# Patient Record
Sex: FEMALE | Race: WHITE | Employment: FULL TIME | URBAN - METROPOLITAN AREA
[De-identification: names, ages, dates, MRNs, and addresses within clinical notes are randomized per-mention and may not be internally consistent; named-entity substitution may affect disease eponyms.]

---

## 2017-02-16 DIAGNOSIS — E03.8 SUBCLINICAL HYPOTHYROIDISM: ICD-10-CM

## 2017-02-21 RX ORDER — LEVOTHYROXINE SODIUM 50 UG/1
TABLET ORAL
Qty: 90 TABLET | Refills: 0 | Status: SHIPPED | OUTPATIENT
Start: 2017-02-21 | End: 2017-05-29

## 2017-03-15 ENCOUNTER — TELEPHONE (OUTPATIENT)
Dept: FAMILY MEDICINE | Facility: CLINIC | Age: 37
End: 2017-03-15

## 2017-03-15 ENCOUNTER — OFFICE VISIT (OUTPATIENT)
Dept: FAMILY MEDICINE | Facility: CLINIC | Age: 37
End: 2017-03-15
Payer: COMMERCIAL

## 2017-03-15 VITALS
SYSTOLIC BLOOD PRESSURE: 118 MMHG | OXYGEN SATURATION: 99 % | DIASTOLIC BLOOD PRESSURE: 64 MMHG | WEIGHT: 162.5 LBS | TEMPERATURE: 97.7 F | HEART RATE: 80 BPM | BODY MASS INDEX: 28.79 KG/M2 | HEIGHT: 63 IN

## 2017-03-15 DIAGNOSIS — I47.10 PAROXYSMAL SUPRAVENTRICULAR TACHYCARDIA (H): ICD-10-CM

## 2017-03-15 DIAGNOSIS — E03.8 SUBCLINICAL HYPOTHYROIDISM: Primary | ICD-10-CM

## 2017-03-15 DIAGNOSIS — R42 LIGHTHEADEDNESS: ICD-10-CM

## 2017-03-15 LAB — TSH SERPL DL<=0.005 MIU/L-ACNC: 1.45 MU/L (ref 0.4–4)

## 2017-03-15 PROCEDURE — 99213 OFFICE O/P EST LOW 20 MIN: CPT | Performed by: FAMILY MEDICINE

## 2017-03-15 PROCEDURE — 36415 COLL VENOUS BLD VENIPUNCTURE: CPT | Performed by: FAMILY MEDICINE

## 2017-03-15 PROCEDURE — 84443 ASSAY THYROID STIM HORMONE: CPT | Performed by: FAMILY MEDICINE

## 2017-03-15 RX ORDER — ATENOLOL 25 MG/1
25 TABLET ORAL DAILY PRN
Qty: 30 TABLET | Refills: 5 | Status: SHIPPED | OUTPATIENT
Start: 2017-03-15 | End: 2018-04-25

## 2017-03-15 ASSESSMENT — PAIN SCALES - GENERAL: PAINLEVEL: NO PAIN (0)

## 2017-03-15 NOTE — TELEPHONE ENCOUNTER
----- Message from Gemini Arredondo MD sent at 3/15/2017  1:04 PM CDT -----  Please notify her that her thyroid is now perfect. MUCH improved from before. I recommend she stay on this dose and I'll refill it for 1 year.  We need to continue to check her thyroid once yearly, and to follow up in between if needed.  I should see her again either in the fall for her next physical or in 1 year from now.   Gemini Arredondo MD

## 2017-03-15 NOTE — TELEPHONE ENCOUNTER
Nathalie returns call and message below is relayed.  Pt states understanding and had no other questions or concerns

## 2017-03-15 NOTE — PROGRESS NOTES
SUBJECTIVE:                                                    Nathalie Younger is a 36 year old female who presents to clinic today for the following health issues:    Hypothyroidism Follow-up    Patient was found to have an abnormal TSH at 4.52 mU/L on 10/31/2016 while at her routine physical. I started her on Levothyroxine 50 mg qd at that time. She has been taking her Levothyroxine daily.       Since last visit, patient describes the following symptoms: Weight stable, no hair loss, no skin changes, no constipation, no loose stools. She feels better.          Amount of exercise or physical activity: 2-3 days/week for an average of 30-45 minutes    Problems taking medications regularly: No    Medication side effects: None  Diet: regular (no restrictions)    Problem list and histories reviewed & adjusted, as indicated.  Additional history: as documented    Patient Active Problem List   Diagnosis     Tobacco use disorder     Headache     CARDIOVASCULAR SCREENING; LDL GOAL LESS THAN 160     Subclinical hypothyroidism     Past Surgical History   Procedure Laterality Date     No history of surgery         Social History   Substance Use Topics     Smoking status: Current Every Day Smoker     Packs/day: 0.50     Years: 3.00     Smokeless tobacco: Never Used     Alcohol use Yes      Comment: not much      Family History   Problem Relation Age of Onset     Hypertension Father      CANCER Maternal Grandmother      lung     Hypertension Sister      CANCER Maternal Grandfather      lung and bone     CANCER Paternal Grandfather      stomach     Prostate Cancer Father      CANCER Father      kidney     Breast Cancer Mother 53     CANCER Paternal Aunt 56     cervical         Current Outpatient Prescriptions   Medication Sig Dispense Refill     atenolol (TENORMIN) 25 MG tablet Take 1 tablet (25 mg) by mouth daily as needed 30 tablet 5     levothyroxine (SYNTHROID/LEVOTHROID) 50 MCG tablet TAKE ONE TABLET BY MOUTH ONCE DAILY 90  "tablet 0     norethindrone-ethinyl estradiol (MICROGESTIN) 1-20 MG-MCG per tablet Take 1 tablet by mouth daily 84 tablet 3     [DISCONTINUED] atenolol (TENORMIN) 25 MG tablet Take 1 tablet (25 mg) by mouth daily 30 tablet 1     fluticasone (FLONASE) 50 MCG/ACT nasal spray Spray 1-2 sprays into both nostrils daily (Patient not taking: Reported on 3/15/2017) 16 g 1     No Known Allergies    Reviewed and updated as needed this visit by clinical staff  Tobacco  Allergies  Meds  Med Hx  Surg Hx  Fam Hx  Soc Hx      Reviewed and updated as needed this visit by Provider       ROS:  Neuro: at her last visit, she had concerns for near syncopal episodes, including a hot flash and lightheadedness. I started her on Atenolol 25 mg at that time. She says that she has only been taking this prn, and it seems to stop these episodes. She says that these episodes have decreased significantly since starting on her Levothyroxine, so she thinks they may have been related to her thyroid. Only having 1-2 times per month now.  She has decreased her caffeine intake, but is still drinking some Red Bull energy drinks.   All other ROS reviewed and are negative or non-contributory except as stated in HPI or above.     OBJECTIVE:                                                    /64 (BP Location: Left arm, Patient Position: Chair, Cuff Size: Adult Regular)  Pulse 80  Temp 97.7  F (36.5  C) (Temporal)  Ht 5' 3\" (1.6 m)  Wt 162 lb 8 oz (73.7 kg)  SpO2 99%  BMI 28.79 kg/m2  Body mass index is 28.79 kg/(m^2).   Vitals noted.  Patient alert, oriented, and in no acute distress.  Neck:  Supple without thyromegaly, lymphadenopathy, JVD or masses.  CV:  RRR without murmur. Pulse down to 80 on exam.   Respiratory:  Lungs clear to auscultation bilaterally.    Diagnostic Test Results:  Orders Placed This Encounter   Procedures     TSH with free T4 reflex        ASSESSMENT:                                                        ICD-10-CM    1. " Subclinical hypothyroidism E03.9 TSH with free T4 reflex   2. Lightheadedness R42 atenolol (TENORMIN) 25 MG tablet   3. Paroxysmal supraventricular tachycardia (H) I47.1 atenolol (TENORMIN) 25 MG tablet       PLAN:                                                      Patient's pulse was fast at 105 upon presentation to the clinic. Vitals were otherwise normal. On re-exam, her pulse was decreased to 80. Labs drawn, as above. Will notify with results and discuss further evaluation/ treatment if needed at that time. Refilled patient's Atenolol, see orders. Will refill her Levothyroxine after I review her labs. If I do not adjust her medications, will follow up in 6 months. If I do adjust her Levothyroxine, follow up in 3 months. Patient is in agreement with this treatment plan.     This document serves as a record of services personally performed by Gemini Arredondo MD. It was created on their behalf by Ladonna Canales, a trained medical scribe. The creation of this record is based on the provider's personal observations and the statements of the patient. This document has been checked and approved by the attending provider.    Gemini Arredondo MD  [facility]

## 2017-03-15 NOTE — PROGRESS NOTES
Please notify her that her thyroid is now perfect. MUCH improved from before. I recommend she stay on this dose and I'll refill it for 1 year.  We need to continue to check her thyroid once yearly, and to follow up in between if needed.  I should see her again either in the fall for her next physical or in 1 year from now.   Gemini Arredondo MD

## 2017-05-29 DIAGNOSIS — E03.8 SUBCLINICAL HYPOTHYROIDISM: ICD-10-CM

## 2017-05-30 NOTE — TELEPHONE ENCOUNTER
levothyroxine (SYNTHROID/LEVOTHROID) 50 MCG tablet     Last Written Prescription Date: 2/21/17  Last Quantity: 90, # refills: 0  Last Office Visit with [facility], [facility] or [facility] prescribing provider: 3/15/17        TSH   Date Value Ref Range Status   03/15/2017 1.45 0.40 - 4.00 mU/L Final

## 2017-05-31 RX ORDER — LEVOTHYROXINE SODIUM 50 UG/1
50 TABLET ORAL DAILY
Qty: 90 TABLET | Refills: 3 | Status: SHIPPED | OUTPATIENT
Start: 2017-05-31 | End: 2018-04-25

## 2017-05-31 NOTE — TELEPHONE ENCOUNTER
Prescription approved per [facility] Refill Protocol.    Ana Olea RN

## 2017-09-30 DIAGNOSIS — Z30.41 ENCOUNTER FOR SURVEILLANCE OF CONTRACEPTIVE PILLS: ICD-10-CM

## 2017-10-02 RX ORDER — ESTAZOLAM 2 MG/1
TABLET ORAL
Qty: 84 TABLET | Refills: 1 | Status: SHIPPED | OUTPATIENT
Start: 2017-10-02 | End: 2018-07-20

## 2017-10-02 NOTE — TELEPHONE ENCOUNTER
norethindrone-ethinyl estradiol (MICROGESTIN) 1-20 MG-MCG per tablet      Last Written Prescription Date: 10/31/16  Last Fill Quantity: 84,  # refills: 3   Last Office Visit with [facility], [facility] or [facility] prescribing provider: 3/15/17

## 2017-10-02 NOTE — TELEPHONE ENCOUNTER
Prescription approved per [facility] Refill Protocol.    Ana Olea RN

## 2018-04-25 DIAGNOSIS — I47.10 PAROXYSMAL SUPRAVENTRICULAR TACHYCARDIA (H): ICD-10-CM

## 2018-04-25 DIAGNOSIS — R42 LIGHTHEADEDNESS: ICD-10-CM

## 2018-04-25 DIAGNOSIS — E03.8 SUBCLINICAL HYPOTHYROIDISM: ICD-10-CM

## 2018-04-25 RX ORDER — ATENOLOL 25 MG/1
25 TABLET ORAL DAILY
Qty: 30 TABLET | Refills: 0 | Status: SHIPPED | OUTPATIENT
Start: 2018-04-25 | End: 2018-04-27

## 2018-04-25 RX ORDER — LEVOTHYROXINE SODIUM 50 UG/1
50 TABLET ORAL DAILY
Qty: 30 TABLET | Refills: 0 | Status: SHIPPED | OUTPATIENT
Start: 2018-04-25 | End: 2018-07-02

## 2018-04-25 NOTE — TELEPHONE ENCOUNTER
"Requested Prescriptions   Pending Prescriptions Disp Refills     atenolol (TENORMIN) 25 MG tablet [Pharmacy Med Name: ATENOLOL 25MG       TAB] 30 tablet 5     Sig: TAKE ONE TABLET BY MOUTH ONCE DAILY AS NEEDED    Beta-Blockers Protocol Failed    4/25/2018  2:22 PM       Failed - Blood pressure under 140/90 in past 12 months    BP Readings from Last 3 Encounters:   03/15/17 118/64   10/31/16 102/78   06/15/16 116/88                Failed - Recent (12 mo) or future (30 days) visit within the authorizing provider's specialty    Patient had office visit in the last 12 months or has a visit in the next 30 days with authorizing provider or within the authorizing provider's specialty.  See \"Patient Info\" tab in inbasket, or \"Choose Columns\" in Meds & Orders section of the refill encounter.           Passed - Patient is age 6 or older        levothyroxine (SYNTHROID/LEVOTHROID) 50 MCG tablet [Pharmacy Med Name: LEVOTHYROXIN 50MCG  TAB] 90 tablet 3     Sig: TAKE ONE TABLET BY MOUTH ONCE DAILY    Thyroid Protocol Failed    4/25/2018  2:22 PM       Failed - Recent (12 mo) or future (30 days) visit within the authorizing provider's specialty    Patient had office visit in the last 12 months or has a visit in the next 30 days with authorizing provider or within the authorizing provider's specialty.  See \"Patient Info\" tab in inbasket, or \"Choose Columns\" in Meds & Orders section of the refill encounter.           Failed - Normal TSH on file in past 12 months    Recent Labs   Lab Test  03/15/17   1120   TSH  1.45             Passed - Patient is 12 years or older       Passed - No active pregnancy on record    If patient is pregnant or has had a positive pregnancy test, please check TSH.         Passed - No positive pregnancy test in past 12 months    If patient is pregnant or has had a positive pregnancy test, please check TSH.        Atenolol  Last Written Prescription Date:  3/15/17  Last Fill Quantity: 30,  # refills: 5   Last " office visit: 3/15/2017 with prescribing provider:  3/15/17   Future Office Visit:      Levothyroxine  Last Written Prescription Date:  5/31/17  Last Fill Quantity: 90,  # refills: 3   Last office visit: 3/15/2017 with prescribing provider:  3/15/17   Future Office Visit:

## 2018-04-25 NOTE — TELEPHONE ENCOUNTER
Rx refilled per RN protocol.  1 month    Will forward to schedulers to schedule patient for OV.  Sonia Flores RN

## 2018-04-26 NOTE — TELEPHONE ENCOUNTER
Pt called back and relayed message below. She states she has no insurance until July 1st. She will not come in til then.

## 2018-04-27 RX ORDER — ATENOLOL 25 MG/1
25 TABLET ORAL DAILY
Qty: 93 TABLET | Refills: 0 | Status: SHIPPED | OUTPATIENT
Start: 2018-04-27 | End: 2018-07-20

## 2018-04-30 NOTE — TELEPHONE ENCOUNTER
Left message for patient to call back and speak with any .    Thank you,  Ivett Jean-Baptiste   for [facility]

## 2018-07-02 DIAGNOSIS — E03.8 SUBCLINICAL HYPOTHYROIDISM: ICD-10-CM

## 2018-07-02 NOTE — TELEPHONE ENCOUNTER
"Requested Prescriptions   Pending Prescriptions Disp Refills     levothyroxine (SYNTHROID/LEVOTHROID) 50 MCG tablet [Pharmacy Med Name: LEVOTHYROXIN 50MCG  TAB] 30 tablet 0     Sig: TAKE 1 TABLET BY MOUTH ONCE DAILY APPOINTMENT  NEEDED  FOR  ADDITIONAL  REFILLS    Thyroid Protocol Failed    7/2/2018 10:48 AM       Failed - Normal TSH on file in past 12 months    Recent Labs   Lab Test  03/15/17   1120   TSH  1.45             Passed - Patient is 12 years or older       Passed - Recent (12 mo) or future (30 days) visit within the authorizing provider's specialty    Patient had office visit in the last 12 months or has a visit in the next 30 days with authorizing provider or within the authorizing provider's specialty.  See \"Patient Info\" tab in inbasket, or \"Choose Columns\" in Meds & Orders section of the refill encounter.           Passed - No active pregnancy on record    If patient is pregnant or has had a positive pregnancy test, please check TSH.         Passed - No positive pregnancy test in past 12 months    If patient is pregnant or has had a positive pregnancy test, please check TSH.          Last Written Prescription Date:  4/25/18  Last Fill Quantity: 30,  # refills: 0   Last office visit: 3/15/2017 with prescribing provider:     Future Office Visit:   Next 5 appointments (look out 90 days)     Jul 20, 2018  8:45 AM CDT   PHYSICAL with Gemini Arredondo MD   [facility]    [address] [address] [phone]                   "

## 2018-07-05 NOTE — TELEPHONE ENCOUNTER
Routing refill request to provider for review/approval because:  Labs not current:  TSH  T'd up 1 month for provider review.  To get to physical on 7/20/18.  Sonia Flores, THOMPSONN, RN

## 2018-07-06 RX ORDER — LEVOTHYROXINE SODIUM 50 UG/1
TABLET ORAL
Qty: 90 TABLET | Refills: 0 | Status: SHIPPED | OUTPATIENT
Start: 2018-07-06 | End: 2018-07-24

## 2018-07-06 NOTE — TELEPHONE ENCOUNTER
Patient calls to check status of refill request.   SHe is upset, as was told it would be filled to last until appointment.   Note from 4/25/18:  OK have her schedule now for July, and I'll approve refills until then.   Gemini Arredondo MD     She is out, if this can be called as soon as possible, appointment is 7/20/18.

## 2018-07-20 ENCOUNTER — OFFICE VISIT (OUTPATIENT)
Dept: FAMILY MEDICINE | Facility: CLINIC | Age: 38
End: 2018-07-20
Payer: COMMERCIAL

## 2018-07-20 VITALS
HEIGHT: 63 IN | TEMPERATURE: 98.4 F | WEIGHT: 162.6 LBS | DIASTOLIC BLOOD PRESSURE: 72 MMHG | SYSTOLIC BLOOD PRESSURE: 108 MMHG | BODY MASS INDEX: 28.81 KG/M2 | HEART RATE: 87 BPM | OXYGEN SATURATION: 99 %

## 2018-07-20 DIAGNOSIS — Z30.41 ENCOUNTER FOR SURVEILLANCE OF CONTRACEPTIVE PILLS: ICD-10-CM

## 2018-07-20 DIAGNOSIS — B07.8 COMMON WART: ICD-10-CM

## 2018-07-20 DIAGNOSIS — F17.200 TOBACCO USE DISORDER: ICD-10-CM

## 2018-07-20 DIAGNOSIS — Z00.01 ENCOUNTER FOR ROUTINE ADULT HEALTH EXAMINATION WITH ABNORMAL FINDINGS: Primary | ICD-10-CM

## 2018-07-20 DIAGNOSIS — R42 LIGHTHEADEDNESS: ICD-10-CM

## 2018-07-20 DIAGNOSIS — R09.81 NASAL SINUS CONGESTION: ICD-10-CM

## 2018-07-20 DIAGNOSIS — D22.9 ATYPICAL MOLE: ICD-10-CM

## 2018-07-20 DIAGNOSIS — Z13.6 CARDIOVASCULAR SCREENING; LDL GOAL LESS THAN 160: ICD-10-CM

## 2018-07-20 DIAGNOSIS — E03.8 SUBCLINICAL HYPOTHYROIDISM: ICD-10-CM

## 2018-07-20 DIAGNOSIS — R21 RASH: ICD-10-CM

## 2018-07-20 DIAGNOSIS — I47.10 PAROXYSMAL SUPRAVENTRICULAR TACHYCARDIA (H): ICD-10-CM

## 2018-07-20 LAB
ANION GAP SERPL CALCULATED.3IONS-SCNC: 9 MMOL/L (ref 3–14)
BUN SERPL-MCNC: 11 MG/DL (ref 7–30)
CALCIUM SERPL-MCNC: 9.1 MG/DL (ref 8.5–10.1)
CHLORIDE SERPL-SCNC: 106 MMOL/L (ref 94–109)
CHOLEST SERPL-MCNC: 178 MG/DL
CO2 SERPL-SCNC: 25 MMOL/L (ref 20–32)
CREAT SERPL-MCNC: 0.84 MG/DL (ref 0.52–1.04)
GFR SERPL CREATININE-BSD FRML MDRD: 77 ML/MIN/1.7M2
GLUCOSE SERPL-MCNC: 88 MG/DL (ref 70–99)
HDLC SERPL-MCNC: 56 MG/DL
LDLC SERPL CALC-MCNC: 99 MG/DL
NONHDLC SERPL-MCNC: 122 MG/DL
POTASSIUM SERPL-SCNC: 4.2 MMOL/L (ref 3.4–5.3)
SODIUM SERPL-SCNC: 140 MMOL/L (ref 133–144)
TRIGL SERPL-MCNC: 117 MG/DL
TSH SERPL DL<=0.005 MIU/L-ACNC: 1.56 MU/L (ref 0.4–4)

## 2018-07-20 PROCEDURE — 84443 ASSAY THYROID STIM HORMONE: CPT | Performed by: FAMILY MEDICINE

## 2018-07-20 PROCEDURE — 80048 BASIC METABOLIC PNL TOTAL CA: CPT | Performed by: FAMILY MEDICINE

## 2018-07-20 PROCEDURE — 80061 LIPID PANEL: CPT | Performed by: FAMILY MEDICINE

## 2018-07-20 PROCEDURE — 36415 COLL VENOUS BLD VENIPUNCTURE: CPT | Performed by: FAMILY MEDICINE

## 2018-07-20 PROCEDURE — 99395 PREV VISIT EST AGE 18-39: CPT | Mod: 25 | Performed by: FAMILY MEDICINE

## 2018-07-20 PROCEDURE — 17110 DESTRUCTION B9 LES UP TO 14: CPT | Performed by: FAMILY MEDICINE

## 2018-07-20 RX ORDER — NORETHINDRONE ACETATE AND ETHINYL ESTRADIOL .02; 1 MG/1; MG/1
1 TABLET ORAL DAILY
Qty: 84 TABLET | Refills: 3 | Status: SHIPPED | OUTPATIENT
Start: 2018-07-20 | End: 2019-08-09

## 2018-07-20 RX ORDER — FLUTICASONE PROPIONATE 50 MCG
1-2 SPRAY, SUSPENSION (ML) NASAL DAILY
Qty: 16 G | Refills: 1 | Status: SHIPPED | OUTPATIENT
Start: 2018-07-20 | End: 2019-09-13

## 2018-07-20 RX ORDER — ATENOLOL 25 MG/1
25 TABLET ORAL DAILY
Qty: 93 TABLET | Refills: 1 | Status: SHIPPED | OUTPATIENT
Start: 2018-07-20 | End: 2019-09-13

## 2018-07-20 ASSESSMENT — PAIN SCALES - GENERAL: PAINLEVEL: NO PAIN (0)

## 2018-07-20 NOTE — MR AVS SNAPSHOT
After Visit Summary   7/20/2018    Nathalie Younger    MRN: [id number]           Patient Information     Date Of Birth          1980        Visit Information        Provider Department      7/20/2018 8:45 AM Gemini Arredondo MD [facility]        Today's Diagnoses     CARDIOVASCULAR SCREENING; LDL GOAL LESS THAN 160    -  1    Lightheadedness        Paroxysmal supraventricular tachycardia (H)        Subclinical hypothyroidism        Encounter for surveillance of contraceptive pills        Nasal sinus congestion          Care Instructions      Preventive Health Recommendations  Female Ages 26 - 39  Yearly exam:   See your health care provider every year in order to    Review health changes.     Discuss preventive care.      Review your medicines if you your doctor has prescribed any.    Until age 30: Get a Pap test every three years (more often if you have had an abnormal result).    After age 30: Talk to your doctor about whether you should have a Pap test every 3 years or have a Pap test with HPV screening every 5 years.   You do not need a Pap test if your uterus was removed (hysterectomy) and you have not had cancer.  You should be tested each year for STDs (sexually transmitted diseases), if you're at risk.   Talk to your provider about how often to have your cholesterol checked.  If you are at risk for diabetes, you should have a diabetes test (fasting glucose).  Shots: Get a flu shot each year. Get a tetanus shot every 10 years.   Nutrition:     Eat at least 5 servings of fruits and vegetables each day.    Eat whole-grain bread, whole-wheat pasta and brown rice instead of white grains and rice.    Get adequate Calcium and Vitamin D.     Lifestyle    Exercise at least 150 minutes a week (30 minutes a day, 5 days of the week). This will help you control your weight and prevent disease.    Limit alcohol to one drink per day.    No smoking.     Wear sunscreen to  "prevent skin cancer.    See your dentist every six months for an exam and cleaning.            Follow-ups after your visit        Who to contact     If you have questions or need follow up information about today's clinic visit or your schedule please contact [facility] directly at [phone].  Normal or non-critical lab and imaging results will be communicated to you by MyChart, letter or phone within 4 business days after the clinic has received the results. If you do not hear from us within 7 days, please contact the clinic through MyChart or phone. If you have a critical or abnormal lab result, we will notify you by phone as soon as possible.  Submit refill requests through [employer] or call your pharmacy and they will forward the refill request to us. Please allow 3 business days for your refill to be completed.          Additional Information About Your Visit        Care EveryWhere ID     This is your Care EveryWhere ID. This could be used by other organizations to access your [facility] medical records  [id number]        Your Vitals Were     Pulse Temperature Height Last Period Pulse Oximetry Breastfeeding?    87 98.4  F (36.9  C) (Temporal) 5' 3\" (1.6 m) 07/11/2018 (Exact Date) 99% No    BMI (Body Mass Index)                   28.8 kg/m2            Blood Pressure from Last 3 Encounters:   07/20/18 108/72   03/15/17 118/64   10/31/16 102/78    Weight from Last 3 Encounters:   07/20/18 162 lb 9.6 oz (73.8 kg)   03/15/17 162 lb 8 oz (73.7 kg)   10/31/16 164 lb 12.8 oz (74.8 kg)              We Performed the Following     Basic metabolic panel     Lipid panel reflex to direct LDL Fasting     TSH with free T4 reflex          Today's Medication Changes          These changes are accurate as of 7/20/18 10:05 AM.  If you have any questions, ask your nurse or doctor.               These medicines have changed or have updated prescriptions.        Dose/Directions    atenolol 25 MG tablet   Commonly " known as:  TENORMIN   This may have changed:  additional instructions   Used for:  Lightheadedness, Paroxysmal supraventricular tachycardia (H)   Changed by:  Gemini Arredondo MD        Dose:  25 mg   Take 1 tablet (25 mg) by mouth daily   Quantity:  93 tablet   Refills:  1       norethindrone-ethinyl estradiol 1-20 MG-MCG per tablet   Commonly known as:  MICROGESTIN   This may have changed:  See the new instructions.   Used for:  Encounter for surveillance of contraceptive pills   Changed by:  Gemini Arredondo MD        Dose:  1 tablet   Take 1 tablet by mouth daily   Quantity:  84 tablet   Refills:  3            Where to get your medicines      These medications [facility] [address] [place] [address] [address]  [address] [facility] [address]     Phone:  [phone]     atenolol 25 MG tablet    fluticasone 50 MCG/ACT spray    norethindrone-ethinyl estradiol 1-20 MG-MCG per tablet                Primary Care Provider Office Phone # Fax #    Gemini Arredondo [phone] [address] [facility] [address] [facility] [address]   [facility] [address]        Equal Access to Services     [facility] HospitalFATOUMATA AH: Hadii aad ku hadasho Soomaali, waaxda luqadaha, qaybta kaalmada adeegyada, waxay idiin hayaan adeeg aaliyah kirkpatrick ah. So [facility] [phone].    ATENCIÓN: Si habla español, tiene a latham disposición servicios gratuitos de asistencia lingüística. [facility] [phone].    We comply with applicable federal civil rights laws and [place] laws. We do not discriminate on the basis of race, color, national origin, age, disability, sex, sexual orientation, or gender identity.            Thank you!     Thank you for choosing [facility]  for your care. Our goal is always to provide you with excellent care. Hearing back from our patients is one way we can continue to improve our services. Please take a few minutes to complete the written survey that you may receive in the mail  after your visit with us. Thank you!             Your Updated Medication List - Protect others around you: Learn how to safely use, store and throw away your medicines at www.disposemymeds.org.          This list is accurate as of 7/20/18 10:05 AM.  Always use your most recent med list.                   Brand Name Dispense Instructions for use Diagnosis    atenolol 25 MG tablet    TENORMIN    93 tablet    Take 1 tablet (25 mg) by mouth daily    Lightheadedness, Paroxysmal supraventricular tachycardia (H)       fluticasone 50 MCG/ACT spray    FLONASE    16 g    Spray 1-2 sprays into both nostrils daily    Nasal sinus congestion       levothyroxine 50 MCG tablet    SYNTHROID/LEVOTHROID    90 tablet    TAKE 1 TABLET BY MOUTH ONCE DAILY APPOINTMENT  NEEDED  FOR  ADDITIONAL  REFILLS    Subclinical hypothyroidism       norethindrone-ethinyl estradiol 1-20 MG-MCG per tablet    MICROGESTIN    84 tablet    Take 1 tablet by mouth daily    Encounter for surveillance of contraceptive pills

## 2018-07-20 NOTE — PROGRESS NOTES
SUBJECTIVE:   CC: Nathalie Younger is an 37 year old woman who presents for preventive health visit.  She also has a few other concerns, see ROS below.        Physical   Annual:     Getting at least 3 servings of Calcium per day:  Yes    Bi-annual eye exam:  NO    Dental care twice a year:  NO    Sleep apnea or symptoms of sleep apnea:  None    Diet:  Regular (no restrictions)    Frequency of exercise:  None    Taking medications regularly:  Yes    Medication side effects:  None    Additional concerns today:  YES (wart treatment, red ladarius on nose)    Today's PHQ-2 Score:   PHQ-2 ( 1999 Pf[employer]) 7/20/2018   Q1: Little interest or pleasure in doing things 0   Q2: Feeling down, depressed or hopeless 0   PHQ-2 Score 0   Q1: Little interest or pleasure in doing things Not at all   Q2: Feeling down, depressed or hopeless Not at all   PHQ-2 Score 0     Answers for HPI/ROS submitted by the patient on 7/20/2018   PHQ-2 Score: 0    Abuse: Current or Past(Physical, Sexual or Emotional)- No  Do you feel safe in your environment - Yes    Social History   Substance Use Topics     Smoking status: Current Every Day Smoker     Packs/day: 0.50     Years: 3.00     Types: Cigarettes     Smokeless tobacco: Never Used     Alcohol use Yes      Comment: not much      Alcohol Use 7/20/2018   If you drink alcohol do you typically have greater than 3 drinks per day OR greater than 7 drinks per week? No   No flowsheet data found.    Reviewed orders with patient.  Reviewed health maintenance and updated orders accordingly - Yes    Mammogram not appropriate for this patient based on age.    History of abnormal Pap smear: NO - age 30- 65 PAP every 3 years recommended  PAP / HPV Latest Ref Rng & Units 10/31/2016 6/3/2013 5/30/2012   PAP - NIL NIL NIL   HPV 16 DNA NEG Negative - -   HPV 18 DNA NEG Negative - -   OTHER HR HPV NEG Negative - -     Reviewed and updated as needed this visit by clinical staff  Tobacco  Allergies  Meds  Med Hx  Surg  "Hx  Fam Hx  Soc Hx        Reviewed and updated as needed this visit by Provider  Tobacco  Med Hx  Surg Hx  Soc Hx         Review of Systems  CONSTITUTIONAL: NEGATIVE for fever, chills, change in weight  INTEGUMENTARY/SKIN: acne/rash on chest and wart on right middle finger.  She has noticed a rash on her chest between her breasts for the past few weeks.  Has tried OTC facial wash and Desitin cream on it with minimal improvement.  She also has a wart on her right middle finger that she would like removed. .  EYES: NEGATIVE for vision changes or irritation  ENT: NEGATIVE for ear, mouth and throat problems  RESP: NEGATIVE for significant cough or SOB  BREAST: NEGATIVE for masses, tenderness or discharge  CV: She is continuing to have some episodes of palpitations/PSVT and has needed her BB about 2x/week for symptoms  GI: NEGATIVE for nausea, abdominal pain, heartburn, or change in bowel habits  : NEGATIVE for unusual urinary or vaginal symptoms. Periods are regular. Would like to get back on her birth control.    MUSCULOSKELETAL: NEGATIVE for significant arthralgias or myalgia  NEURO: lightheadedness associated with palpitations occasionally.   PSYCHIATRIC: NEGATIVE for changes in mood or affect     OBJECTIVE:   /72  Pulse 87  Temp 98.4  F (36.9  C) (Temporal)  Ht 5' 3\" (1.6 m)  Wt 162 lb 9.6 oz (73.8 kg)  LMP 07/11/2018 (Exact Date)  SpO2 99%  Breastfeeding? No  BMI 28.8 kg/m2  Physical Exam  GENERAL: healthy, alert and no distress  EYES: Eyes grossly normal to inspection, PERRL and conjunctivae and sclerae normal  HENT: ear canals and TM's normal, nose and mouth without ulcers or lesions  NECK: no adenopathy, no asymmetry, masses, or scars and thyroid normal to palpation  RESP: lungs clear to auscultation - no rales, rhonchi or wheezes  CV: occasional premature beats, normal S1 S2, no S3 or S4, no murmur, click or rub and no peripheral edema  ABDOMEN: soft, nontender, no hepatosplenomegaly, no " masses and bowel sounds normal  MS: no gross musculoskeletal defects noted, no edema  SKIN: small callused wart on palm side of  right middle finger, proximal phalanx.  Also has a 0.5 cm dark brownish black mole - under left breast and rash between breasts with few scattered red papules 1-2 mm in size each.    NEURO: Normal strength and tone, mentation intact and speech normal  PSYCH: mentation appears normal, affect normal/bright    Diagnostic Test Results:  Orders Placed This Encounter   Procedures     TSH with free T4 reflex     Lipid panel reflex to direct LDL Fasting     Basic metabolic panel        ASSESSMENT/PLAN:       ICD-10-CM    1. Encounter for routine adult health examination with abnormal findings Z00.01    2. Lightheadedness R42 atenolol (TENORMIN) 25 MG tablet   3. Paroxysmal supraventricular tachycardia (H) I47.1 atenolol (TENORMIN) 25 MG tablet   4. Subclinical hypothyroidism E03.9 TSH with free T4 reflex     Basic metabolic panel   5. Encounter for surveillance of contraceptive pills Z30.41 norethindrone-ethinyl estradiol (MICROGESTIN) 1-20 MG-MCG per tablet   6. Nasal sinus congestion R09.81 fluticasone (FLONASE) 50 MCG/ACT spray   7. CARDIOVASCULAR SCREENING; LDL GOAL LESS THAN 160 Z13.6 Lipid panel reflex to direct LDL Fasting     Basic metabolic panel   8. Tobacco use disorder F17.200    9. Common wart B07.8 DESTRUCT BENIGN LESION, UP TO 14   10. Rash R21    11. Atypical mole D22.9        Recommend yearly pe, encouraged monthly SBE.   Recommend complete smoking cessation, especially given her strong family history of cancers. Discussed link between many cancers and tobacco use.   Reordered her meds, see orders.   Will notify with lab results.     For her wart, I pared it with a scalpel and froze x 2 with cryogun.  Discussed post-cryo care.  If it blisters, remove the roof on the 2nd or 3rd day.  If not, let any irritation heal for 3-4 days as needed, then begin home treatment with compound W  "or Duofilm daily, then return for re-treatment in 2-3 weeks if persists.     For her rash, recommend she try lotrimin OTC and continue current skin care regimen.   I recommend she come in for excisional biopsy of the mole under left breast.     COUNSELING:  Reviewed preventive health counseling, as reflected in patient instructions       Contraception    BP Readings from Last 1 Encounters:   07/20/18 108/72     Estimated body mass index is 28.8 kg/(m^2) as calculated from the following:    Height as of this encounter: 5' 3\" (1.6 m).    Weight as of this encounter: 162 lb 9.6 oz (73.8 kg).      Weight management plan: introduce exercise     reports that she has been smoking Cigarettes.  She has a 1.50 pack-year smoking history. She has never used smokeless tobacco.  Tobacco Cessation Action Plan: Information offered: Patient not interested at this time    Counseling Resources:  ATP IV Guidelines  Pooled Cohorts Equation Calculator  Breast Cancer Risk Calculator  FRAX Risk Assessment  ICSI Preventive Guidelines  Dietary Guidelines for Americans, 2010  USDA's MyPlate  ASA Prophylaxis  Lung CA Screening    Jennifer DUTTA  Pt was personally seen, interviewed and examined by me, chart notes edited and I agree with assessment as documented above.    Gemini Arredondo MD  [facility]  "

## 2018-07-24 DIAGNOSIS — E03.8 SUBCLINICAL HYPOTHYROIDISM: ICD-10-CM

## 2018-07-24 RX ORDER — LEVOTHYROXINE SODIUM 50 UG/1
TABLET ORAL
Qty: 93 TABLET | Refills: 3 | Status: SHIPPED | OUTPATIENT
Start: 2018-07-24 | End: 2019-09-13

## 2018-08-26 ENCOUNTER — OFFICE VISIT (OUTPATIENT)
Dept: URGENT CARE | Facility: RETAIL CLINIC | Age: 38
End: 2018-08-26
Payer: COMMERCIAL

## 2018-08-26 VITALS
DIASTOLIC BLOOD PRESSURE: 76 MMHG | HEART RATE: 111 BPM | TEMPERATURE: 97.6 F | OXYGEN SATURATION: 99 % | SYSTOLIC BLOOD PRESSURE: 110 MMHG

## 2018-08-26 DIAGNOSIS — J02.9 ACUTE PHARYNGITIS, UNSPECIFIED ETIOLOGY: Primary | ICD-10-CM

## 2018-08-26 DIAGNOSIS — B34.9 VIRAL SYNDROME: ICD-10-CM

## 2018-08-26 LAB — S PYO AG THROAT QL IA.RAPID: NORMAL

## 2018-08-26 PROCEDURE — 87880 STREP A ASSAY W/OPTIC: CPT | Mod: QW | Performed by: FAMILY MEDICINE

## 2018-08-26 PROCEDURE — 99213 OFFICE O/P EST LOW 20 MIN: CPT | Performed by: FAMILY MEDICINE

## 2018-08-26 PROCEDURE — 87081 CULTURE SCREEN ONLY: CPT | Performed by: FAMILY MEDICINE

## 2018-08-26 NOTE — PROGRESS NOTES
SUBJECTIVE:  Nathalie Younger is a 37 year old female with a chief complaint of sore throat.  Onset of symptoms was 6 day(s) ago.    Course of illness: still present.  Severity moderate  Current and Associated symptoms: fever, chills, sweats, ear pain bilateral, body aches, fatigue and diarrhea  Treatment measures tried include Tylenol/Ibuprofen, Fluids and Rest.  Predisposing factors include None.    Past Medical History:   Diagnosis Date     NO ACTIVE PROBLEMS      Current Outpatient Prescriptions   Medication Sig Dispense Refill     atenolol (TENORMIN) 25 MG tablet Take 1 tablet (25 mg) by mouth daily 93 tablet 1     levothyroxine (SYNTHROID/LEVOTHROID) 50 MCG tablet TAKE 1 TABLET BY MOUTH ONCE DAILY 93 tablet 3     norethindrone-ethinyl estradiol (MICROGESTIN) 1-20 MG-MCG per tablet Take 1 tablet by mouth daily 84 tablet 3     fluticasone (FLONASE) 50 MCG/ACT spray Spray 1-2 sprays into both nostrils daily (Patient not taking: Reported on 8/26/2018) 16 g 1     History   Smoking Status     Current Every Day Smoker     Packs/day: 0.50     Years: 3.00     Types: Cigarettes   Smokeless Tobacco     Never Used       ROS:  Review of systems negative except as stated above.    OBJECTIVE:   /76  Pulse 111  Temp 97.6  F (36.4  C) (Oral)  SpO2 99%  GENERAL APPEARANCE: alert and mild distress  EYES: EOMI,  PERRL, conjunctiva clear  HENT: tonsillar erythema  NECK: supple, non-tender to palpation, no adenopathy noted  RESP: lungs clear to auscultation - no rales, rhonchi or wheezes  CV: regular rates and rhythm, normal S1 S2, no murmur noted  ABDOMEN:  soft, nontender, no HSM or masses and bowel sounds normal  SKIN: no suspicious lesions or rashes    Rapid Strep test is negative; await throat culture results.    ASSESSMENT:     Acute pharyngitis, unspecified etiology  Viral syndrome    PLAN: rest, work note.  Symptomatic treat with gargles, lozenges, and OTC analgesic as needed.   Follow-up with primary care  provider if not improving.

## 2018-08-26 NOTE — LETTER
August 26, 2018      Nathalie Younger  [address] [facility] [address]        To Whom It May Concern:    Nathalie Younger was seen in our clinic today and missed work she will need to have tomorrow off as well for her illness. She may return to work without restrictions on 08/28/2018.      Sincerely,        Harsha Oglesby MD

## 2018-08-26 NOTE — MR AVS SNAPSHOT
After Visit Summary   8/26/2018    Nathalie Younger    MRN: [id number]           Patient Information     Date Of Birth          1980        Visit Information        Provider Department      8/26/2018 9:40 AM Harsha Oglesby MD [facility]        Today's Diagnoses     Acute pharyngitis, unspecified etiology    -  1    Viral syndrome           Follow-ups after your visit        Who to contact     You can reach your care team any time of the day by calling [phone].  Notification of test results:  If you have an abnormal lab result, we will notify you by phone as soon as possible.         Additional Information About Your Visit        Care EveryWhere ID     This is your Care EveryWhere ID. This could be used by other organizations to access your [place] medical records  [id number]        Your Vitals Were     Pulse Temperature Pulse Oximetry             111 97.6  F (36.4  C) (Oral) 99%          Blood Pressure from Last 3 Encounters:   08/26/18 110/76   07/20/18 108/72   03/15/17 118/64    Weight from Last 3 Encounters:   07/20/18 162 lb 9.6 oz (73.8 kg)   03/15/17 162 lb 8 oz (73.7 kg)   10/31/16 164 lb 12.8 oz (74.8 kg)              We Performed the Following     BETA STREP GROUP A R/O CULTURE     RAPID STREP SCREEN        Primary Care Provider Office Phone # Fax #    Gemini Carmela Arredondo [phone] [address] [place] [address]        Equal Access to Services     [facility]: Hadii aad ku hadasho Soken, waaxda luqadaha, qaybta kaalmada adeegyada, nica glass. So [facility] [phone].    ATENCIÓN: Si habla español, tiene a latham disposición servicios gratuitos de asistencia lingüística. Llame al [phone].    We comply with applicable federal civil rights laws and [place] laws. We do not discriminate on the basis of race, color, national origin, age, disability, sex, sexual orientation, or gender  identity.            Thank you!     Thank you for choosing [facility]  for your care. Our goal is always to provide you with excellent care. Hearing back from our patients is one way we can continue to improve our services. Please take a few minutes to complete the written survey that you may receive in the mail after your visit with us. Thank you!             Your Updated Medication List - Protect others around you: Learn how to safely use, store and throw away your medicines at www.disposemymeds.org.          This list is accurate as of 8/26/18 11:18 AM.  Always use your most recent med list.                   Brand Name Dispense Instructions for use Diagnosis    atenolol 25 MG tablet    TENORMIN    93 tablet    Take 1 tablet (25 mg) by mouth daily    Lightheadedness, Paroxysmal supraventricular tachycardia (H)       fluticasone 50 MCG/ACT spray    FLONASE    16 g    Spray 1-2 sprays into both nostrils daily    Nasal sinus congestion       levothyroxine 50 MCG tablet    SYNTHROID/LEVOTHROID    93 tablet    TAKE 1 TABLET BY MOUTH ONCE DAILY    Subclinical hypothyroidism       norethindrone-ethinyl estradiol 1-20 MG-MCG per tablet    MICROGESTIN    84 tablet    Take 1 tablet by mouth daily    Encounter for surveillance of contraceptive pills

## 2018-08-28 LAB
BACTERIA SPEC CULT: NORMAL
SPECIMEN SOURCE: NORMAL

## 2018-11-01 ENCOUNTER — OFFICE VISIT (OUTPATIENT)
Dept: URGENT CARE | Facility: RETAIL CLINIC | Age: 38
End: 2018-11-01
Payer: COMMERCIAL

## 2018-11-01 VITALS
HEART RATE: 90 BPM | TEMPERATURE: 98.3 F | OXYGEN SATURATION: 98 % | SYSTOLIC BLOOD PRESSURE: 108 MMHG | DIASTOLIC BLOOD PRESSURE: 77 MMHG

## 2018-11-01 DIAGNOSIS — J01.90 ACUTE SINUSITIS WITH SYMPTOMS > 10 DAYS: Primary | ICD-10-CM

## 2018-11-01 PROCEDURE — 99213 OFFICE O/P EST LOW 20 MIN: CPT | Performed by: FAMILY MEDICINE

## 2018-11-01 RX ORDER — AZITHROMYCIN 250 MG/1
500 TABLET, FILM COATED ORAL DAILY
Qty: 10 TABLET | Refills: 0 | Status: SHIPPED | OUTPATIENT
Start: 2018-11-01 | End: 2018-11-06

## 2018-11-01 NOTE — MR AVS SNAPSHOT
"              After Visit Summary   2018    Nathalie Younger    MRN: [id number]           Patient Information     Date Of Birth          1980        Visit Information        Provider Department      2018 5:00 PM Harsha Oglesby MD [facility]        Today's Diagnoses     Acute sinusitis with symptoms > 10 days    -  1       Follow-ups after your visit        Who to contact     You can reach your care team any time of the day by calling [phone].  Notification of test results:  If you have an abnormal lab result, we will notify you by phone as soon as possible.         Additional Information About Your Visit        MyChart Information     [employer] lets you send messages to your doctor, view your test results, renew your prescriptions, schedule appointments and more. To sign up, go to [email or web address] . Click on \"Log in\" on the left side of the screen, which will take you to the Welcome page. Then click on \"Sign up Now\" on the right side of the page.     You will be asked to enter the access code listed below, as well as some personal information. Please follow the directions to create your username and password.     Your access code is: [email or web address]  Expires: 2019  5:18 PM     Your access code will  in 90 days. If you need help or a new code, please call your [place] clinic or [phone].        Care EveryWhere ID     This is your Care EveryWhere ID. This could be used by other organizations to access your [place] medical records  [id number]        Your Vitals Were     Pulse Temperature Pulse Oximetry             90 98.3  F (36.8  C) (Oral) 98%          Blood Pressure from Last 3 Encounters:   18 108/77   18 110/76   18 108/72    Weight from Last 3 Encounters:   18 162 lb 9.6 oz (73.8 kg)   03/15/17 162 lb 8 oz (73.7 kg)   10/31/16 164 lb 12.8 oz (74.8 kg)              Today, you had the following     No orders found for display    "      Today's Medication Changes          These changes are accurate as of 11/1/18  5:18 PM.  If you have any questions, ask your nurse or doctor.               Start taking these medicines.        Dose/Directions    azithromycin 250 MG tablet   Commonly known as:  ZITHROMAX   Used for:  Acute sinusitis with symptoms > 10 days   Started by:  Harsha Oglesby MD        Dose:  500 mg   Take 2 tablets (500 mg) by mouth daily for 5 days   Quantity:  10 tablet   Refills:  0            Where to get your medicines      These medications were sent [address] [facility] [address]     Phone:  [phone]     azithromycin 250 MG tablet                Primary Care Provider Office Phone # Fax #    Gemini Carmela Arredondo [phone] [address]   [place] [address]        Equal Access to Services     [facility]: Hadii xiao russell hadasho Soomaali, waaxda luqadaha, qaybta kaalmada adeegyada, nica spencer haymel kirkpatrick . So [facility] [phone].    ATENCIÓN: Si habla español, tiene a latham disposición servicios gratuitos de asistencia lingüística. Llame al [phone].    We comply with applicable federal civil rights laws and [place] laws. We do not discriminate on the basis of race, color, national origin, age, disability, sex, sexual orientation, or gender identity.            Thank you!     Thank you for choosing [facility]  for your care. Our goal is always to provide you with excellent care. Hearing back from our patients is one way we can continue to improve our services. Please take a few minutes to complete the written survey that you may receive in the mail after your visit with us. Thank you!             Your Updated Medication List - Protect others around you: Learn how to safely use, store and throw away your medicines at www.disposemymeds.org.          This list is accurate as of 11/1/18  5:18  PM.  Always use your most recent med list.                   Brand Name Dispense Instructions for use Diagnosis    atenolol 25 MG tablet    TENORMIN    93 tablet    Take 1 tablet (25 mg) by mouth daily    Lightheadedness, Paroxysmal supraventricular tachycardia (H)       azithromycin 250 MG tablet    ZITHROMAX    10 tablet    Take 2 tablets (500 mg) by mouth daily for 5 days    Acute sinusitis with symptoms > 10 days       fluticasone 50 MCG/ACT spray    FLONASE    16 g    Spray 1-2 sprays into both nostrils daily    Nasal sinus congestion       levothyroxine 50 MCG tablet    SYNTHROID/LEVOTHROID    93 tablet    TAKE 1 TABLET BY MOUTH ONCE DAILY    Subclinical hypothyroidism       norethindrone-ethinyl estradiol 1-20 MG-MCG per tablet    MICROGESTIN    84 tablet    Take 1 tablet by mouth daily    Encounter for surveillance of contraceptive pills

## 2018-11-01 NOTE — PROGRESS NOTES
SUBJECTIVE:  Nathalie Younger is a 37 year old female here with concerns about sinus infection.  She states onset of symptoms were 3 week(s) ago.  She has had maxillary pressure. Course of illness is worsening. Severity moderately severe  Current and Associated symptoms: nasal congestion, rhinorrhea, facial pain/pressure, headache and post-nasal drainage  Predisposing factors include none. Recent treatment has included: Antihistamine, Decongestants and OTC meds    Past Medical History:   Diagnosis Date     NO ACTIVE PROBLEMS      Current Outpatient Prescriptions   Medication Sig Dispense Refill     atenolol (TENORMIN) 25 MG tablet Take 1 tablet (25 mg) by mouth daily 93 tablet 1     azithromycin (ZITHROMAX) 250 MG tablet Take 2 tablets (500 mg) by mouth daily for 5 days 10 tablet 0     fluticasone (FLONASE) 50 MCG/ACT spray Spray 1-2 sprays into both nostrils daily 16 g 1     levothyroxine (SYNTHROID/LEVOTHROID) 50 MCG tablet TAKE 1 TABLET BY MOUTH ONCE DAILY 93 tablet 3     norethindrone-ethinyl estradiol (MICROGESTIN) 1-20 MG-MCG per tablet Take 1 tablet by mouth daily 84 tablet 3     History   Smoking Status     Current Every Day Smoker     Packs/day: 0.50     Years: 3.00     Types: Cigarettes   Smokeless Tobacco     Never Used       ROS:  Review of systems negative except as stated above.    OBJECTIVE:  /77  Pulse 90  Temp 98.3  F (36.8  C) (Oral)  SpO2 98%  Exam:GENERAL APPEARANCE: mild distress  EYES: EOMI,  PERRL, conjunctiva clear  HENT: nasal turbinates erythematous, swollen  NECK: supple, nontender, no lymphadenopathy  RESP: lungs clear to auscultation - no rales, rhonchi or wheezes  CV: regular rates and rhythm, normal S1 S2, no murmur noted  ABDOMEN:  soft, nontender, no HSM or masses and bowel sounds normal  NEURO: Normal strength and tone, sensory exam grossly normal,  normal speech and mentation  SKIN: no suspicious lesions or rashes    ASSESSMENT:  Sinusitis    PLAN:  Zithromax, fluids,  rest, stop smoking.  Follow up with primary care provider if not improving.

## 2019-05-08 ENCOUNTER — VIRTUAL VISIT (OUTPATIENT)
Dept: FAMILY MEDICINE | Facility: OTHER | Age: 39
End: 2019-05-08

## 2019-05-08 NOTE — PROGRESS NOTES
"Date:   Clinician: Surjit Devlin  Clinician NPI: [id number]  Patient: Nathalie Younger  Patient : 1980  Patient Address: [address] [place] [address]  Patient Phone: [phone]  Visit Protocol: URI  Patient Summary:  Nathalie is a 38 year old ( : 1980 ) female who initiated a Visit for cold, sinus infection, or influenza. When asked the question \"Please sign me up to receive news, health information and promotions from [employer].\", Nathalie responded \"No\".    Nathalie states her symptoms started suddenly 1 month or more ago. After her symptoms started, they improved and then got worse again.   Her symptoms consist of a headache, malaise, enlarged lymph nodes, a cough, nasal congestion, and ear pain. She is experiencing difficulty breathing due to nasal congestion but she is not short of breath.   Symptom details     Nasal secretions: The color of her mucus is green.    Cough: Nathalie coughs a few times an hour and her cough is not more bothersome at night. Phlegm comes into her throat when she coughs. She believes the phlegm causes the cough. The color of the phlegm is green.     Headache: Nathalie has not had the headache for 12 weeks or more. She states the headache is mild (1-3 on a 10 point pain scale).      Nathalie denies having sore throat, fever, facial pain or pressure, myalgias, chills, rhinitis, wheezing, and teeth pain. She also denies taking antibiotic medication for the symptoms and having recent facial or sinus surgery in the past 60 days.   She has not recently been exposed to someone with influenza. Nathalie has been in close contact with the following high risk individuals: people with asthma, heart disease or diabetes and children under the age of 5.   Nathalie had 2 sinus infections within the past year.   Weight: 160 lbs   Nathalie smokes or uses smokeless tobacco.   She denies pregnancy and denies breastfeeding. She is currently menstruating.   MEDICATIONS: atenolol oral, " levothyroxine oral, ALLERGIES: NKDA  Clinician Response:  Dear Nathalie,  Based on the information provided, you have acute bacterial sinusitis, also known as a sinus infection. Sinus infections are caused by bacteria or a virus and symptoms are almost always identical. The difference between the 2 types of infections is timing.  Sinus infections start as viral infections and symptoms improve on their own in about 7 days. If symptoms have not improved after 7 days or have even worsened, a bacterial infection may have developed.  Medication information  I am prescribing:     Amoxicillin 500 mg oral tablet. Take 1 tablet by mouth every 8 hours for 10 days. There are no refills with this prescription.   Antibiotics can cause an allergic reaction even if you have taken them without a problem in the past. If you develop a new rash, swelling, or difficulty breathing, stop the medication and be seen in a clinic or urgent care immediately.  A yeast infection is a side effect of taking antibiotics in some women. Please use [facility] to get treatment if you have symptoms of a yeast infection.  If you become pregnant during this course of treatment, stop taking the medication and contact your primary care provider.  Self care  The following tips will keep you as comfortable as possible while you recover:     Rest    Drink plenty of water and other liquids    Take a hot shower to loosen congestion    Take a spoonful of honey to reduce your cough     Also, as your provider, I need you to know that becoming tobacco-free is the most important thing you can do to protect your current and future health.  When to seek care  Please be seen in a clinic or urgent care if any of the following occur:     Symptoms do not start to improve after 3 days of treatment    New symptoms develop, or symptoms become worse      Diagnosis: Acute bacterial sinusitis  Diagnosis ICD: J01.90  Prescription: amoxicillin 500 mg oral tablet 30 tablet, 10 days  supply. Take 1 tablet by mouth every 8 hours for 10 days. Refills: 0, Refill as needed: no, Allow substitutions: yes  Pharmacy: [facility] - [phone] - [address] [facility] [place] [address]

## 2019-07-22 ENCOUNTER — OFFICE VISIT (OUTPATIENT)
Dept: FAMILY MEDICINE | Facility: CLINIC | Age: 39
End: 2019-07-22
Payer: COMMERCIAL

## 2019-07-22 VITALS
TEMPERATURE: 98.1 F | BODY MASS INDEX: 29.87 KG/M2 | SYSTOLIC BLOOD PRESSURE: 122 MMHG | RESPIRATION RATE: 18 BRPM | DIASTOLIC BLOOD PRESSURE: 76 MMHG | OXYGEN SATURATION: 98 % | HEART RATE: 84 BPM | WEIGHT: 168.6 LBS

## 2019-07-22 DIAGNOSIS — J34.89 SORE IN NOSE: ICD-10-CM

## 2019-07-22 DIAGNOSIS — I49.1 PAC (PREMATURE ATRIAL CONTRACTION): ICD-10-CM

## 2019-07-22 DIAGNOSIS — F41.8 SITUATIONAL ANXIETY: Primary | ICD-10-CM

## 2019-07-22 DIAGNOSIS — E03.8 SUBCLINICAL HYPOTHYROIDISM: ICD-10-CM

## 2019-07-22 LAB — TSH SERPL DL<=0.005 MIU/L-ACNC: 2.03 MU/L (ref 0.4–4)

## 2019-07-22 PROCEDURE — 99214 OFFICE O/P EST MOD 30 MIN: CPT | Performed by: FAMILY MEDICINE

## 2019-07-22 PROCEDURE — 36415 COLL VENOUS BLD VENIPUNCTURE: CPT | Performed by: FAMILY MEDICINE

## 2019-07-22 PROCEDURE — 84443 ASSAY THYROID STIM HORMONE: CPT | Performed by: FAMILY MEDICINE

## 2019-07-22 PROCEDURE — 93000 ELECTROCARDIOGRAM COMPLETE: CPT | Performed by: FAMILY MEDICINE

## 2019-07-22 RX ORDER — MUPIROCIN 20 MG/G
OINTMENT TOPICAL
Qty: 15 G | Refills: 1 | Status: SHIPPED | OUTPATIENT
Start: 2019-07-22 | End: 2020-09-21

## 2019-07-22 RX ORDER — SERTRALINE HYDROCHLORIDE 25 MG/1
TABLET, FILM COATED ORAL
Qty: 60 TABLET | Refills: 1 | Status: SHIPPED | OUTPATIENT
Start: 2019-07-22 | End: 2019-09-13

## 2019-07-22 ASSESSMENT — PAIN SCALES - GENERAL: PAINLEVEL: NO PAIN (0)

## 2019-07-22 NOTE — PATIENT INSTRUCTIONS
Stop at the lab today and have your thyroid level drawn. I'll notify you with results.     I'm starting you on a new medication today for anxiety, called Sertraline or Zoloft.    Take 1 pill daily at bedtime for the first 2 weeks, then increase to 2 pills daily at bedtime.     I'd like you to keep your September appt because we can follow up and see how it's working.     You may need to have some counseling as well, to help you learn how to minimize your driving anxiety, unless the medication works great.     In addition, I've sent over a Rx for nasal ointment for these sores. Apply this in the nose three times daily for 1 week and if they are not healing, let me know and I'll recommend a referral to the ENT (ears, nose, and throat) doctor.     Let me know if you have any side effects from the new medication as we discussed, such as abnormal or worrisome thoughts, depression, wanting to hurt yourself or others, headaches, fatigue, etc. I don't expect these side effects, so hopefully you'll find it makes driving easier.     Gemini Arredondo MD

## 2019-07-24 ENCOUNTER — TELEPHONE (OUTPATIENT)
Dept: FAMILY MEDICINE | Facility: CLINIC | Age: 39
End: 2019-07-24

## 2019-07-24 NOTE — TELEPHONE ENCOUNTER
----- Message from Gemini Arredondo MD sent at 7/24/2019 12:10 AM CDT -----  Please notify patient with nl result. Send copy of labs.   Gemini Arredondo MD

## 2019-07-24 NOTE — TELEPHONE ENCOUNTER
Left message for patient to return. Please advise of results when call is returned.  Thank you  Stacy France MA

## 2019-07-24 NOTE — PROGRESS NOTES
S:  Nathalie Younger is a 38 year old female who complains of panic attacks, related to driving.   She has a history of feeling faint, in the mornings, and lately she has become so nervous about passing out that she has been unable to drive. She has had several instances where she either couldn't drive herself home or to work, or she did but had to stop and pull over several times and it took much longer than it needed to to get where she was going.   She sometimes is gripping the wheel so hard that she gets pain and numbness and tingling in her arms, hard time breathing, and is constantly looking for a place to get off the road. This never happens except with driving.  She gets no chest pain or symptoms otherwise.    She also has some sores in her nose that will not heal.  They are in both sides, and sometimes will scab up so much that she has to pick the scabs off just to clear the passages so she can breathe.  She has tried nasal sprays, saline spray, with no improvement.    Also 2 Saturdays ago she had a migraine and she is really just felt off since then.  She just does not feel like her normal self.    Patient Active Problem List    Diagnosis Date Noted     Subclinical hypothyroidism 11/01/2016     Priority: Medium     CARDIOVASCULAR SCREENING; LDL GOAL LESS THAN 160 10/31/2010     Priority: Medium     Tobacco use disorder 03/30/2005     Priority: Medium     Headache 03/30/2005     Priority: Medium     Problem list name updated by automated process. Provider to review          Past Medical History:   Diagnosis Date     NO ACTIVE PROBLEMS         Past Surgical History:   Procedure Laterality Date     NO HISTORY OF SURGERY          Family History   Problem Relation Age of Onset     Breast Cancer Mother 53     Hypertension Father      Prostate Cancer Father      Cancer Father         kidney, metastatic to bone.      Cancer Maternal Grandmother         lung     Cancer Paternal Grandfather         stomach     Cancer  Maternal Grandfather         lung and bone     Hypertension Sister      Cancer Paternal Aunt 56        cervical     Cancer Paternal Aunt 56        ovarian     Cancer Maternal Uncle 50        rectal       10 pt ROS is otherwise negative except as noted in HPI.      O:  /76   Pulse 84   Temp 98.1  F (36.7  C) (Temporal)   Resp 18   Wt 76.5 kg (168 lb 9.6 oz)   SpO2 98%   BMI 29.87 kg/m     Vitals noted.  Patient alert, oriented, and in no acute distress.   Neck:  Supple without lymphadenopathy, JVD or masses.   CV:  Underlying rhythm is regular but with frequent extra beats, without murmur.   Respiratory:  Lungs clear to auscultation bilaterally.     EKG shows NSR with occasional PVCs and PACs, no change from previous.     Orders Placed This Encounter   Procedures     TSH with free T4 reflex     EKG 12-lead complete w/read - Clinics        A:      ICD-10-CM    1. Situational anxiety F41.8 sertraline (ZOLOFT) 25 MG tablet   2. Subclinical hypothyroidism E03.9 TSH with free T4 reflex   3. Sore in nose J34.89 mupirocin (BACTROBAN) 2 % external ointment   4. PAC (premature atrial contraction) I49.1 EKG 12-lead complete w/read - Clinics       P: We talked at length about anxiety while driving.  She cannot use a typical anxiolytic because it will likely be sedating.  We talked about counseling and desensitization as primary methods of recovering from this problem.  However a daily preventive anxiety medication such as sertraline may help as well.  We opted to try her on this.  See instructions below.  I do encourage her to find a counselor and if she needs a referral she will let me know.    I opted to check a TSH because of her history and her EKG findings.  We will notify her with results.    For her nose, I would like to try some Bactroban ointment.  If this does not help I will recommend referral to ENT for possible nasopharyngoscopy.    See patient instructions:    Stop at the lab today and have your  thyroid level drawn. I'll notify you with results.     I'm starting you on a new medication today for anxiety, called Sertraline or Zoloft.    Take 1 pill daily at bedtime for the first 2 weeks, then increase to 2 pills daily at bedtime.     I'd like you to keep your September appt because we can follow up and see how it's working.     You may need to have some counseling as well, to help you learn how to minimize your driving anxiety, unless the medication works great.     In addition, I've sent over a Rx for nasal ointment for these sores. Apply this in the nose three times daily for 1 week and if they are not healing, let me know and I'll recommend a referral to the ENT (ears, nose, and throat) doctor.     Let me know if you have any side effects from the new medication as we discussed, such as abnormal or worrisome thoughts, depression, wanting to hurt yourself or others, headaches, fatigue, etc. I don't expect these side effects, so hopefully you'll find it makes driving easier.     Gemini Arredondo MD

## 2019-07-25 ENCOUNTER — TELEPHONE (OUTPATIENT)
Dept: FAMILY MEDICINE | Facility: CLINIC | Age: 39
End: 2019-07-25

## 2019-07-25 NOTE — TELEPHONE ENCOUNTER
Reason for Call:  Other prescription    Detailed comments: pt is experiencing severe dizziness due to Zoloft.  Please call.    Phone Number Patient can be reached at: Home number on file [phone] (home)    Best Time:     Can we leave a detailed message on this number? YES    Call taken on 7/25/2019 at 1:23 PM by Ghada Gentile

## 2019-07-25 NOTE — TELEPHONE ENCOUNTER
She should hold the Zoloft for now, see if the dizziness resolves over the next few days and if so, then she can contact Dr. Arredondo and see if she should try a different medication.  If the dizziness/lightheadedness does not go away then she should be seen for further evaluation.    Will have staff notify patient.    Avery Hui MD

## 2019-07-25 NOTE — TELEPHONE ENCOUNTER
Patient is reporting she started taking 1/2 of a pill of Zoloft every night.  The first 2 days she started this pill she felt like she had some slight motion sickness.  Today she is feeling much more dizzy.  She is going to have a co-worker go to the pharmacist to see if there is an OTC medication she can take to offset the dizziness so that she can drive home.  If this doesn't help she will have someone drive her home.    Patient is denying: breathing problems, confusion, fever, rash anywhere, loss of consciousness, besides the dizziness which is causing her vision to be shaky and have a bit of nausea.      Routing to covering provider to review.  Should patient take this medication tonight?  Or skip it?    Message also sent to PCP for plan upon return to clinic tomorrow, 7/26/19.  Sonia Flores, THOMPSONN, RN

## 2019-07-29 ENCOUNTER — TELEPHONE (OUTPATIENT)
Dept: FAMILY MEDICINE | Facility: CLINIC | Age: 39
End: 2019-07-29

## 2019-07-29 NOTE — TELEPHONE ENCOUNTER
Per Gemini Arredondo MD she would like patient to try taking the Zoloft at 1/2 a tablet per night again for about 2 days and see if she feels the same dizziness again. If patient is feeling dizzy again then she should stop taking the Zoloft and call the clinic for recommendations. If patient is not feeling the dizziness she can continue to take the Zoloft at 1/2 tablet for 2 weeks and then increase to a whole tablet per sig.  Pt advised on MD notes as written and states understanding. She is agreeable to this plan.  Hanna Robert CMA

## 2019-07-29 NOTE — TELEPHONE ENCOUNTER
Reason for call:  Other   Patient called regarding (reason for call): call back  Additional comments: Last week was on a medication, last Thursday another doc pulled her off it due to dizzy spells, patient is not dizzy after being off of it. Was told to call back today and speak to Dr. Arredondo's team.        [facility] [address] [place] [address] [address]      Phone number to reach patient:  Cell number on file:    Telephone Information:   Mobile [phone]       Best Time:  ASAP     Can we leave a detailed message on this number?  YES               [facility] [address] [place] [address] [address]

## 2019-08-09 DIAGNOSIS — Z30.41 ENCOUNTER FOR SURVEILLANCE OF CONTRACEPTIVE PILLS: ICD-10-CM

## 2019-08-09 NOTE — TELEPHONE ENCOUNTER
"Requested Prescriptions   Pending Prescriptions Disp Refills     MICROGESTIN 1-20 MG-MCG tablet [Pharmacy Med Name: MICROGESTIN 1/20    TAB] 84 tablet 3     Sig: TAKE 1 TABLET BY MOUTH ONCE DAILY   Last Written Prescription Date:  7/20/18  Last Fill Quantity: 84,  # refills: 3   Last office visit: 7/22/2019 with prescribing provider:     Future Office Visit:   Next 5 appointments (look out 90 days)    Sep 13, 2019  8:00 AM CDT  [facility]YS[facility] with [facility]ondo [facility] [address] [place] [id number] [phone]             Contraceptives Protocol Failed - 8/9/2019  5:30 AM        Failed - Patient is not a current smoker if age is 35 or older        Passed - Recent (12 mo) or future (30 days) visit within the authorizing provider's specialty     Patient had office visit in the last 12 months or has a visit in the next 30 days with authorizing provider or within the authorizing provider's specialty.  See \"Patient Info\" tab in inbasket, or \"Choose Columns\" in Meds & Orders section of the refill encounter.              Passed - Medication is active on med list        Passed - No active pregnancy on record        Passed - No positive pregnancy test in past 12 months          "

## 2019-08-09 NOTE — TELEPHONE ENCOUNTER
Routing refill request to provider for review/approval because:  Patient is a smoker.  T'd up  months for provider review to get to physical.  Sonia Flores, THOMPSONN, RN

## 2019-08-13 RX ORDER — NORETHINDRONE ACETATE AND ETHINYL ESTRADIOL .02; 1 MG/1; MG/1
1 TABLET ORAL DAILY
Qty: 84 TABLET | Refills: 0 | Status: SHIPPED | OUTPATIENT
Start: 2019-08-13 | End: 2019-09-13

## 2019-09-13 ENCOUNTER — OFFICE VISIT (OUTPATIENT)
Dept: FAMILY MEDICINE | Facility: CLINIC | Age: 39
End: 2019-09-13
Payer: COMMERCIAL

## 2019-09-13 VITALS
TEMPERATURE: 98.7 F | RESPIRATION RATE: 18 BRPM | OXYGEN SATURATION: 95 % | SYSTOLIC BLOOD PRESSURE: 104 MMHG | BODY MASS INDEX: 29.06 KG/M2 | HEIGHT: 63 IN | HEART RATE: 86 BPM | DIASTOLIC BLOOD PRESSURE: 76 MMHG | WEIGHT: 164 LBS

## 2019-09-13 DIAGNOSIS — Z12.4 SCREENING FOR MALIGNANT NEOPLASM OF CERVIX: ICD-10-CM

## 2019-09-13 DIAGNOSIS — Z30.41 ENCOUNTER FOR SURVEILLANCE OF CONTRACEPTIVE PILLS: ICD-10-CM

## 2019-09-13 DIAGNOSIS — I47.10 PAROXYSMAL SUPRAVENTRICULAR TACHYCARDIA (H): ICD-10-CM

## 2019-09-13 DIAGNOSIS — F41.8 SITUATIONAL ANXIETY: ICD-10-CM

## 2019-09-13 DIAGNOSIS — D22.9 ATYPICAL MOLE: ICD-10-CM

## 2019-09-13 DIAGNOSIS — R09.81 NASAL SINUS CONGESTION: ICD-10-CM

## 2019-09-13 DIAGNOSIS — Z00.00 ROUTINE GENERAL MEDICAL EXAMINATION AT A HEALTH CARE FACILITY: Primary | ICD-10-CM

## 2019-09-13 DIAGNOSIS — E03.8 SUBCLINICAL HYPOTHYROIDISM: ICD-10-CM

## 2019-09-13 PROCEDURE — 87624 HPV HI-RISK TYP POOLED RSLT: CPT | Performed by: FAMILY MEDICINE

## 2019-09-13 PROCEDURE — 99395 PREV VISIT EST AGE 18-39: CPT | Performed by: FAMILY MEDICINE

## 2019-09-13 PROCEDURE — G0145 SCR C/V CYTO,THINLAYER,RESCR: HCPCS | Performed by: FAMILY MEDICINE

## 2019-09-13 RX ORDER — SERTRALINE HYDROCHLORIDE 100 MG/1
100 TABLET, FILM COATED ORAL DAILY
Qty: 90 TABLET | Refills: 3 | Status: SHIPPED | OUTPATIENT
Start: 2019-09-13 | End: 2020-09-21

## 2019-09-13 RX ORDER — FLUTICASONE PROPIONATE 50 MCG
1-2 SPRAY, SUSPENSION (ML) NASAL DAILY
Qty: 16 G | Refills: 1 | Status: SHIPPED | OUTPATIENT
Start: 2019-09-13 | End: 2020-09-21

## 2019-09-13 RX ORDER — LEVOTHYROXINE SODIUM 50 UG/1
TABLET ORAL
Qty: 93 TABLET | Refills: 3 | Status: SHIPPED | OUTPATIENT
Start: 2019-09-13 | End: 2020-09-21

## 2019-09-13 RX ORDER — ATENOLOL 25 MG/1
25 TABLET ORAL DAILY
Qty: 93 TABLET | Refills: 3 | Status: SHIPPED | OUTPATIENT
Start: 2019-09-13 | End: 2020-09-21

## 2019-09-13 RX ORDER — NORETHINDRONE ACETATE AND ETHINYL ESTRADIOL .02; 1 MG/1; MG/1
1 TABLET ORAL DAILY
Qty: 84 TABLET | Refills: 4 | Status: SHIPPED | OUTPATIENT
Start: 2019-09-13 | End: 2020-09-21

## 2019-09-13 ASSESSMENT — MIFFLIN-ST. JEOR: SCORE: 1403.03

## 2019-09-13 ASSESSMENT — PAIN SCALES - GENERAL: PAINLEVEL: NO PAIN (0)

## 2019-09-13 NOTE — PROGRESS NOTES
SUBJECTIVE:   CC: Nathalie Younger is an 38 year old woman who presents for preventive health visit.     Healthy Habits:    Getting at least 3 servings of Calcium per day:  Yes    Bi-annual eye exam:  Yes    Dental care twice a year:  NO    Sleep apnea or symptoms of sleep apnea:  None    Diet:  Carbohydrate counting    Frequency of exercise:  None    Duration of exercise:  N/A    Taking medications regularly:  Yes    Barriers to taking medications:  None    Medication side effects:  None    PHQ-2 Total Score:    Additional concerns today:  No      Today's PHQ-2 Score:   PHQ-2 ( 1999 [employer]) 9/13/2019   Q1: Little interest or pleasure in doing things 0   Q2: Feeling down, depressed or hopeless 0   PHQ-2 Score 0   Q1: Little interest or pleasure in doing things -   Q2: Feeling down, depressed or hopeless -   PHQ-2 Score -       Abuse: Current or Past(Physical, Sexual or Emotional)- No  Do you feel safe in your environment? Yes    Social History     Tobacco Use     Smoking status: Current Every Day Smoker     Packs/day: 0.50     Years: 3.00     Pack years: 1.50     Types: Cigarettes     Smokeless tobacco: Never Used   Substance Use Topics     Alcohol use: Yes     Comment: not much          Alcohol Use 7/20/2018   Prescreen: >3 drinks/day or >7 drinks/week? No   Prescreen: >3 drinks/day or >7 drinks/week? -       Reviewed orders with patient.  Reviewed health maintenance and updated orders accordingly - Yes  BP Readings from Last 3 Encounters:   09/13/19 104/76   07/22/19 122/76   11/01/18 108/77    Wt Readings from Last 3 Encounters:   09/13/19 74.4 kg (164 lb)   07/22/19 76.5 kg (168 lb 9.6 oz)   07/20/18 73.8 kg (162 lb 9.6 oz)                  Patient Active Problem List   Diagnosis     Tobacco use disorder     Headache     CARDIOVASCULAR SCREENING; LDL GOAL LESS THAN 160     Subclinical hypothyroidism     Past Surgical History:   Procedure Laterality Date     NO HISTORY OF SURGERY         Social History      Tobacco Use     Smoking status: Current Every Day Smoker     Packs/day: 0.50     Years: 3.00     Pack years: 1.50     Types: Cigarettes     Smokeless tobacco: Never Used   Substance Use Topics     Alcohol use: Yes     Comment: not much      Family History   Problem Relation Age of Onset     Breast Cancer Mother 53     Hypertension Father      Prostate Cancer Father      Cancer Father         kidney, metastatic to bone.      Cancer Maternal Grandmother         lung     Cancer Paternal Grandfather         stomach     Cancer Maternal Grandfather         lung and bone     Hypertension Sister      Cancer Paternal Aunt 56        cervical     Cancer Paternal Aunt 56        ovarian     Cancer Maternal Uncle 50        rectal           Mammogram not appropriate for this patient based on age.  History of abnormal Pap smear: NO - age 30- 65 PAP every 3 years recommended  PAP / HPV Latest Ref Rng & Units 10/31/2016 6/3/2013 5/30/2012   PAP - NIL NIL NIL   HPV 16 DNA NEG Negative - -   HPV 18 DNA NEG Negative - -   OTHER HR HPV NEG Negative - -     Reviewed and updated as needed this visit by clinical staff  Tobacco  Allergies  Meds  Problems  Med Hx  Surg Hx  Fam Hx  Soc Hx          Reviewed and updated as needed this visit by Provider  Tobacco  Allergies  Meds  Problems  Med Hx  Surg Hx  Fam Hx          Review of Systems  CONSTITUTIONAL: NEGATIVE for fever, chills, did gain some weight. At home was up to 174, now on Keto diet and losing again, but not very fast.   INTEGUMENTARU/SKIN: NEGATIVE for worrisome rashes, moles or lesions  EYES: NEGATIVE for vision changes or irritation  ENT: NEGATIVE for ear, mouth and throat problems  RESP: NEGATIVE for significant cough or SOB  BREAST: NEGATIVE for masses, tenderness or discharge, not doing SBE. Family hx cancer.   CV: NEGATIVE for chest pain, palpitations or peripheral edema, doing well on Atenolol.   GI: NEGATIVE for nausea, abdominal pain, heartburn, positive  "for diarrhea.  Wonders if it is from the zoloft, has read that can be a side effect.   : NEGATIVE for unusual urinary or vaginal symptoms. Periods are regular usually, on the pill, but last month she got 2 periods. Had it for a week, then off for a week, now has it again. This is unusual for her, wondering if it's from the Zoloft.   MUSCULOSKELETAL: NEGATIVE for significant arthralgias or myalgia  NEURO: NEGATIVE for weakness, dizziness or paresthesias  PSYCHIATRIC: NEGATIVE for changes in mood or affect. Thinks the zoloft is helpful but maybe not enough, wondering if she could raise dose a little.      OBJECTIVE:   /76   Pulse 86   Temp 98.7  F (37.1  C) (Temporal)   Resp 18   Ht 1.6 m (5' 3\")   Wt 74.4 kg (164 lb)   LMP 09/06/2019 (Approximate)   SpO2 95%   Breastfeeding? No   BMI 29.05 kg/m    Physical Exam  GENERAL: healthy, alert and no distress  EYES: Eyes grossly normal to inspection, PERRL and conjunctivae and sclerae normal  HENT: ear canals and TM's normal, nose and mouth without ulcers or lesions  NECK: no adenopathy, no asymmetry, masses, or scars and thyroid normal to palpation  RESP: lungs clear to auscultation - no rales, rhonchi or wheezes  BREAST: normal without masses, tenderness or nipple discharge and no palpable axillary masses or adenopathy, but dense across upper outer portion of breasts, symmetric.   CV: regular rate and rhythm, normal S1 S2, no S3 or S4, no murmur, click or rub, no peripheral edema and peripheral pulses strong  ABDOMEN: soft, nontender, no hepatosplenomegaly, no masses and bowel sounds normal   (female): normal female external genitalia, normal urethral meatus, vaginal mucosa pink, small amount of old blood present, moist, well rugated, and normal cervix/adnexa/uterus without masses or discharge. Pap collected without complication.   MS: no gross musculoskeletal defects noted, no edema  SKIN: dark brown flat 5 mm mole under left breast. No other suspicious " "lesions.   NEURO: Normal strength and tone, mentation intact and speech normal  PSYCH: mentation appears normal, affect normal/bright    Diagnostic Test Results:  none     ASSESSMENT/PLAN:       ICD-10-CM    1. Routine general medical examination at a health care facility Z00.00    2. Paroxysmal supraventricular tachycardia (H) I47.1 atenolol (TENORMIN) 25 MG tablet   3. Situational anxiety F41.8 sertraline (ZOLOFT) 100 MG tablet   4. Subclinical hypothyroidism E03.9 levothyroxine (SYNTHROID/LEVOTHROID) 50 MCG tablet   5. Nasal sinus congestion R09.81 fluticasone (FLONASE) 50 MCG/ACT nasal spray   6. Encounter for surveillance of contraceptive pills Z30.41 norethindrone-ethinyl estradiol (MICROGESTIN) 1-20 MG-MCG tablet   7. Screening for malignant neoplasm of cervix Z12.4 Pap imaged thin layer screen with HPV - recommended age 30 - 65     HPV High Risk Types DNA Cervical   8. Atypical mole D22.9      Recommend yearly pe, monthly SBE recommended.  Pap q 3  Years if normal.     COUNSELING:  Reviewed preventive health counseling, as reflected in patient instructions  Special attention given to:        Regular exercise encouraged.        Healthy diet/nutrition - may continue Keto diet, working on weight loss.        Vision screening       Immunizations    Declined: Influenza due to Conscientious objector        Otherwise up to date.        Contraception refilled. Will monitor periods for ongoing abnormalities.     Estimated body mass index is 29.05 kg/m  as calculated from the following:    Height as of this encounter: 1.6 m (5' 3\").    Weight as of this encounter: 74.4 kg (164 lb).    Weight management plan: Discussed healthy diet and exercise guidelines     reports that she has been smoking cigarettes.  She has a 1.50 pack-year smoking history. She has never used smokeless tobacco.  Encouraged complete abstinence.     Recommend that she come back for mole removal on the mole below left breast.     Will increase her " Zoloft to 100 mg for now, monitor for improvement and also for worsening diarrhea. If she does get worse diarrhea, will need to either drop back down to 50 mg or switch to new medication. I don't suspect this was the cause for her period change.     Counseling Resources:  ATP IV Guidelines  Pooled Cohorts Equation Calculator  Breast Cancer Risk Calculator  FRAX Risk Assessment  ICSI Preventive Guidelines  Dietary Guidelines for Americans, 2010  USDA's MyPlate  ASA Prophylaxis  Lung CA Screening    Gemini Arredondo MD  [facility]

## 2019-09-13 NOTE — LETTER
September 20, 2019    Nathalie IBRAHIM Aren  [address] [facility] [id number]    Dear ,  This letter is regarding your recent Pap smear (cervical cancer screening) and Human Papillomavirus (HPV) test.  We are happy to inform you that your Pap smear result is normal. Cervical cancer is closely linked with certain types of HPV. Your results showed no evidence of high-risk HPV.  We recommend you have your next PAP smear in 3 years.  You will still need to return to the clinic every year for an annual exam and other preventive tests.  If you have additional questions regarding this result, please call our registered nurse, Skye at [phone].  Sincerely,    Gemini Arredondo MD /[facility]

## 2019-09-17 LAB
COPATH REPORT: NORMAL
PAP: NORMAL

## 2019-09-18 LAB
FINAL DIAGNOSIS: NORMAL
HPV HR 12 DNA CVX QL NAA+PROBE: NEGATIVE
HPV16 DNA SPEC QL NAA+PROBE: NEGATIVE
HPV18 DNA SPEC QL NAA+PROBE: NEGATIVE
SPECIMEN DESCRIPTION: NORMAL
SPECIMEN SOURCE CVX/VAG CYTO: NORMAL

## 2019-09-19 DIAGNOSIS — F41.8 SITUATIONAL ANXIETY: ICD-10-CM

## 2019-09-20 NOTE — TELEPHONE ENCOUNTER
"Denied  Duplicate    Requested Prescriptions   Pending Prescriptions Disp Refills     sertraline (ZOLOFT) 50 MG tablet [Pharmacy Med Name: SERTRALINE 50MG TAB] 30 tablet 1     Sig: TAKE 1/2 (ONE-HALF) TABLET BY MOUTH ONCE DAILY FOR 14 DAYS THEN  INCREASE  TO  1  TABLET  DAILY   Last Written Prescription Date:  9/13/2019  Last Fill Quantity: 90,  # refills: 3   Last office visit: 9/13/2019 with prescribing provider:     Future Office Visit:        SSRIs Protocol Passed - 9/19/2019  7:55 PM        Passed - Recent (12 mo) or future (30 days) visit within the authorizing provider's specialty     Patient had office visit in the last 12 months or has a visit in the next 30 days with authorizing provider or within the authorizing provider's specialty.  See \"Patient Info\" tab in inbasket, or \"Choose Columns\" in Meds & Orders section of the refill encounter.            Passed - Medication is active on med list        Passed - Patient is age 18 or older        Passed - No active pregnancy on record        Passed - No positive pregnancy test in last 12 months      Sara Mock RN      "

## 2020-09-21 ENCOUNTER — OFFICE VISIT (OUTPATIENT)
Dept: FAMILY MEDICINE | Facility: CLINIC | Age: 40
End: 2020-09-21
Payer: COMMERCIAL

## 2020-09-21 VITALS
OXYGEN SATURATION: 95 % | RESPIRATION RATE: 18 BRPM | HEIGHT: 64 IN | SYSTOLIC BLOOD PRESSURE: 110 MMHG | BODY MASS INDEX: 29.02 KG/M2 | TEMPERATURE: 97.7 F | HEART RATE: 63 BPM | WEIGHT: 170 LBS | DIASTOLIC BLOOD PRESSURE: 78 MMHG

## 2020-09-21 DIAGNOSIS — L72.3 SEBACEOUS CYST: ICD-10-CM

## 2020-09-21 DIAGNOSIS — Z00.00 ROUTINE PHYSICAL EXAMINATION: Primary | ICD-10-CM

## 2020-09-21 DIAGNOSIS — I47.10 PAROXYSMAL SUPRAVENTRICULAR TACHYCARDIA (H): ICD-10-CM

## 2020-09-21 DIAGNOSIS — E03.8 SUBCLINICAL HYPOTHYROIDISM: ICD-10-CM

## 2020-09-21 DIAGNOSIS — R09.81 NASAL SINUS CONGESTION: ICD-10-CM

## 2020-09-21 DIAGNOSIS — F41.8 SITUATIONAL ANXIETY: ICD-10-CM

## 2020-09-21 DIAGNOSIS — Z30.41 ENCOUNTER FOR SURVEILLANCE OF CONTRACEPTIVE PILLS: ICD-10-CM

## 2020-09-21 DIAGNOSIS — R20.2 PARESTHESIA: ICD-10-CM

## 2020-09-21 DIAGNOSIS — Z23 NEED FOR PROPHYLACTIC VACCINATION AND INOCULATION AGAINST INFLUENZA: ICD-10-CM

## 2020-09-21 LAB — TSH SERPL DL<=0.005 MIU/L-ACNC: 2.65 MU/L (ref 0.4–4)

## 2020-09-21 PROCEDURE — 99395 PREV VISIT EST AGE 18-39: CPT | Mod: 25 | Performed by: FAMILY MEDICINE

## 2020-09-21 PROCEDURE — 36415 COLL VENOUS BLD VENIPUNCTURE: CPT | Performed by: FAMILY MEDICINE

## 2020-09-21 PROCEDURE — 90472 IMMUNIZATION ADMIN EACH ADD: CPT | Performed by: FAMILY MEDICINE

## 2020-09-21 PROCEDURE — 90686 IIV4 VACC NO PRSV 0.5 ML IM: CPT | Performed by: FAMILY MEDICINE

## 2020-09-21 PROCEDURE — 90715 TDAP VACCINE 7 YRS/> IM: CPT | Performed by: FAMILY MEDICINE

## 2020-09-21 PROCEDURE — 84443 ASSAY THYROID STIM HORMONE: CPT | Performed by: FAMILY MEDICINE

## 2020-09-21 PROCEDURE — 90471 IMMUNIZATION ADMIN: CPT | Performed by: FAMILY MEDICINE

## 2020-09-21 RX ORDER — NORETHINDRONE ACETATE AND ETHINYL ESTRADIOL .02; 1 MG/1; MG/1
1 TABLET ORAL DAILY
Qty: 112 TABLET | Refills: 3 | Status: SHIPPED | OUTPATIENT
Start: 2020-09-21 | End: 2021-09-24

## 2020-09-21 RX ORDER — LEVOTHYROXINE SODIUM 50 UG/1
TABLET ORAL
Qty: 93 TABLET | Refills: 3 | Status: SHIPPED | OUTPATIENT
Start: 2020-09-21 | End: 2021-09-24

## 2020-09-21 RX ORDER — ATENOLOL 25 MG/1
25 TABLET ORAL DAILY
Qty: 93 TABLET | Refills: 3 | Status: SHIPPED | OUTPATIENT
Start: 2020-09-21 | End: 2021-09-24

## 2020-09-21 RX ORDER — FLUTICASONE PROPIONATE 50 MCG
2 SPRAY, SUSPENSION (ML) NASAL DAILY
Qty: 16 G | Refills: 1 | Status: SHIPPED | OUTPATIENT
Start: 2020-09-21 | End: 2021-09-24

## 2020-09-21 RX ORDER — SERTRALINE HYDROCHLORIDE 100 MG/1
100 TABLET, FILM COATED ORAL DAILY
Qty: 90 TABLET | Refills: 3 | Status: SHIPPED | OUTPATIENT
Start: 2020-09-21 | End: 2021-09-22

## 2020-09-21 ASSESSMENT — ENCOUNTER SYMPTOMS
ABDOMINAL PAIN: 0
DIARRHEA: 0
CHILLS: 0
HEMATURIA: 0
HEMATOCHEZIA: 0
CONSTIPATION: 0
DIZZINESS: 0
COUGH: 0

## 2020-09-21 ASSESSMENT — MIFFLIN-ST. JEOR: SCORE: 1437.14

## 2020-09-21 ASSESSMENT — PAIN SCALES - GENERAL: PAINLEVEL: EXTREME PAIN (9)

## 2020-09-21 NOTE — PROGRESS NOTES
SUBJECTIVE:   CC: Nathalie Younger is an 39 year old woman who presents for preventive health visit.     Patient has been advised of split billing requirements and indicates understanding: Yes  Healthy Habits:     Getting at least 3 servings of Calcium per day:  Yes    Bi-annual eye exam:  Yes    Dental care twice a year:  NO    Sleep apnea or symptoms of sleep apnea:  None    Diet:  Carbohydrate counting    Frequency of exercise:  None    Taking medications regularly:  Yes    Medication side effects:  None    PHQ-2 Total Score: 0    Additional concerns today:  Yes (1) cyst in groin area, 2) has a hot spot on right calf that comes and goes)      Today's PHQ-2 Score:   PHQ-2 ( 1999 [employer]) 9/21/2020   Q1: Little interest or pleasure in doing things 0   Q2: Feeling down, depressed or hopeless 0   PHQ-2 Score 0   Q1: Little interest or pleasure in doing things Not at all   Q2: Feeling down, depressed or hopeless Not at all   PHQ-2 Score 0       Abuse: Current or Past (Physical, Sexual or Emotional) - No  Do you feel safe in your environment? Yes        Social History     Tobacco Use     Smoking status: Current Every Day Smoker     Packs/day: 0.50     Years: 3.00     Pack years: 1.50     Types: Cigarettes     Smokeless tobacco: Never Used   Substance Use Topics     Alcohol use: Yes     Comment: not much          Alcohol Use 9/21/2020   Prescreen: >3 drinks/day or >7 drinks/week? No   Prescreen: >3 drinks/day or >7 drinks/week? -       Reviewed orders with patient.  Reviewed health maintenance and updated orders accordingly - Yes  BP Readings from Last 3 Encounters:   10/02/20 100/68   09/21/20 110/78   09/13/19 104/76    Wt Readings from Last 3 Encounters:   10/02/20 77.7 kg (171 lb 4 oz)   09/21/20 77.1 kg (170 lb)   09/13/19 74.4 kg (164 lb)                  Patient Active Problem List   Diagnosis     Tobacco use disorder     Headache     CARDIOVASCULAR SCREENING; LDL GOAL LESS THAN 160     Subclinical  hypothyroidism     Past Surgical History:   Procedure Laterality Date     NO HISTORY OF SURGERY         Social History     Tobacco Use     Smoking status: Current Every Day Smoker     Packs/day: 0.50     Years: 3.00     Pack years: 1.50     Types: Cigarettes     Smokeless tobacco: Never Used   Substance Use Topics     Alcohol use: Yes     Comment: not much      Family History   Problem Relation Age of Onset     Breast Cancer Mother 53     Hypertension Father      Prostate Cancer Father      Cancer Father         kidney, metastatic to bone.      Hypertension Sister      Asthma Brother      Cancer Maternal Grandmother         lung     Cancer Paternal Grandfather         stomach     Cancer Maternal Grandfather         lung and bone     Cancer Paternal Aunt 56        cervical     Cancer Paternal Aunt 56        ovarian     Cancer Maternal Uncle 50        rectal           Mammogram not appropriate for this patient based on age.    History of abnormal Pap smear: NO - age 30- 65 PAP every 3 years recommended  PAP / HPV Latest Ref Rng & Units 9/13/2019 10/31/2016 6/3/2013   PAP - NIL NIL NIL   HPV 16 DNA NEG:Negative Negative Negative -   HPV 18 DNA NEG:Negative Negative Negative -   OTHER HR HPV NEG:Negative Negative Negative -     Reviewed and updated as needed this visit by clinical staff  Tobacco  Allergies  Meds  Problems  Med Hx  Surg Hx  Fam Hx  Soc Hx          Reviewed and updated as needed this visit by Provider  Tobacco  Allergies  Meds  Problems  Med Hx  Surg Hx  Fam Hx             Review of Systems   Constitutional: Negative for chills.   HENT: Negative for congestion.    Respiratory: Negative for cough.    Cardiovascular: Negative for chest pain.   Gastrointestinal: Negative for abdominal pain, constipation, diarrhea and hematochezia.   Genitourinary: Negative for hematuria.   Neurological: Negative for dizziness.     She has a cyst or something in the right groin area, has been there about 2 1/2  "years, thinks it may have started with an ingrown hair. It gets big and painful at times, right now  but not as bad. She gets a small amount of blood and drainage if she squeezes it.     Also at times feels a \"hot spot\" in her right lower leg for the past month, intermittently. Just comes and goes at random for short periods of time. No obvious injury, no visible changes, no rash.      OBJECTIVE:   /78 (Cuff Size: Adult Regular)   Pulse 63   Temp 97.7  F (36.5  C) (Temporal)   Resp 18   Ht 1.619 m (5' 3.75\")   Wt 77.1 kg (170 lb)   LMP 08/21/2020   SpO2 95%   BMI 29.41 kg/m    Physical Exam  GENERAL: healthy, alert and no distress  EYES: Eyes grossly normal to inspection, PERRL and conjunctivae and sclerae normal  HENT: ear canals and TM's normal, nose and mouth without ulcers or lesions  NECK: no adenopathy, no asymmetry, masses, or scars and thyroid normal to palpation  RESP: lungs clear to auscultation - no rales, rhonchi or wheezes  BREAST: normal without masses, tenderness or nipple discharge and no palpable axillary masses or adenopathy  CV: regular rate and rhythm, normal S1 S2, no S3 or S4, no murmur, click or rub, no peripheral edema and peripheral pulses strong  ABDOMEN: soft, nontender, no hepatosplenomegaly, no masses and bowel sounds normal  Right inguinal area shows a small scarred skin lesion,purplish in color, fairly flat and thickened as if scarred from prior inflammation, approximately 1 cm in size, just lateral to the right labia majora. Tender to squeeze but no active bleeding or drainage, no induration or mass beneath it.   MS: no gross musculoskeletal defects noted, no edema. Right lower leg is normal. No visible defect or deformity. No tenderness to palpation, no cords.   SKIN: no suspicious lesions or rashes  NEURO: Normal strength and tone, mentation intact and speech normal. Skin sensation intact.   PSYCH: mentation appears normal, affect " "normal/bright    Diagnostic Test Results:  Orders Placed This Encounter   Procedures     TDAP, IM (10 - 64 YRS) - Adacel     EA ADD'L VACCINE     INFLUENZA VACCINE IM > 6 MONTHS VALENT IIV4 [63980]     Vaccine Administration, Initial [59763]     TSH with free T4 reflex        ASSESSMENT/PLAN:       ICD-10-CM    1. Routine physical examination  Z00.00    2. Paroxysmal supraventricular tachycardia (H)  I47.1 atenolol (TENORMIN) 25 MG tablet   3. Subclinical hypothyroidism  E03.9 levothyroxine (SYNTHROID/LEVOTHROID) 50 MCG tablet     TSH with free T4 reflex   4. Encounter for surveillance of contraceptive pills  Z30.41 norethindrone-ethinyl estradiol (MICROGESTIN) 1-20 MG-MCG tablet   5. Situational anxiety  F41.8 sertraline (ZOLOFT) 100 MG tablet   6. Nasal sinus congestion  R09.81 fluticasone (FLONASE) 50 MCG/ACT nasal spray   7. Need for prophylactic vaccination and inoculation against influenza  Z23 INFLUENZA VACCINE IM > 6 MONTHS VALENT IIV4 [03577]     Vaccine Administration, Initial [52922]   8. Sebaceous cyst  L72.3    9. Paresthesia  R20.2      Recommend yearly physical, monthly SBE, pap q 3 years.   For her cyst on the groin, I advised her it is likely a sebaceous cyst that seems to be healed right now.  Looks just like a scar to me.  Does not appear inflamed or infected but is still very tender for her.  We could leave this alone and wait till it flares up again and treat with antibiotics or I offered to excise it and she would prefer to have it excised.  We set up an appointment for the next few weeks to have that taken out.    For the spot on her leg that feels \"hot\" discussed that this could be a pinched nerve in her back or other nerve damage. There is no lesion at the location of her pain, so this is a radiating pain of some sort. Discusses stretching her legs and back. We could so an MRI or EMG to look for nerve injury. Will follow for now and notify me if getting worse.   Refilled her medications. " "  Will notify with lab results.     COUNSELING:  Reviewed preventive health counseling, as reflected in patient instructions  Special attention given to:        Regular exercise       Healthy diet/nutrition       Vision screening       Immunizations    Vaccinated for: Influenza and TDAP          Estimated body mass index is 29.41 kg/m  as calculated from the following:    Height as of this encounter: 1.619 m (5' 3.75\").    Weight as of this encounter: 77.1 kg (170 lb).    Weight management plan: Discussed healthy diet and exercise guidelines    She reports that she has been smoking cigarettes. She has a 1.50 pack-year smoking history. She has never used smokeless tobacco.  Tobacco Cessation Action Plan:   Not discussed at length today.       Counseling Resources:  ATP IV Guidelines  Pooled Cohorts Equation Calculator  Breast Cancer Risk Calculator  BRCA-Related Cancer Risk Assessment: FHS-7 Tool  FRAX Risk Assessment  ICSI Preventive Guidelines  Dietary Guidelines for Americans, 2010  USDA's MyPlate  ASA Prophylaxis  Lung CA Screening    Gemini Arredondo MD  [facility]  "

## 2020-09-21 NOTE — NURSING NOTE
Prior to immunization administration, verified patients identity using patient s name and date of birth. Please see Immunization Activity for additional information.     Screening Questionnaire for Adult Immunization    Are you sick today?   No   Do you have allergies to medications, food, a vaccine component or latex?   No   Have you ever had a serious reaction after receiving a vaccination?   No   Do you have a long-term health problem with heart, lung, kidney, or metabolic disease (e.g., diabetes), asthma, a blood disorder, no spleen, complement component deficiency, a cochlear implant, or a spinal fluid leak?  Are you on long-term aspirin therapy?   No   Do you have cancer, leukemia, HIV/AIDS, or any other immune system problem?   No   Do you have a parent, brother, or sister with an immune system problem?   No   In the past 3 months, have you taken medications that affect  your immune system, such as prednisone, other steroids, or anticancer drugs; drugs for the treatment of rheumatoid arthritis, Crohn s disease, or psoriasis; or have you had radiation treatments?   No   Have you had a seizure, or a brain or other nervous system problem?   No   During the past year, have you received a transfusion of blood or blood    products, or been given immune (gamma) globulin or antiviral drug?   No   For women: Are you pregnant or is there a chance you could become       pregnant during the next month?   No   Have you received any vaccinations in the past 4 weeks?   No     Immunization questionnaire answers were all negative.        Per orders of Dr. Arredondo, injection of tdap given by Codie Singleton MA. Patient instructed to remain in clinic for 15 minutes afterwards, and to report any adverse reaction to me immediately.       Screening performed by Codie Singleton MA on 9/21/2020 at 11:19 AM.

## 2020-09-22 ENCOUNTER — TELEPHONE (OUTPATIENT)
Dept: FAMILY MEDICINE | Facility: CLINIC | Age: 40
End: 2020-09-22

## 2020-09-22 NOTE — TELEPHONE ENCOUNTER
----- Message from Gemini Arredondo MD sent at 9/21/2020 11:10 PM CDT -----  Notify Nathalie that her thyroid level is perfect. I'll renew her dose the same.   Gemini Arredondo MD

## 2020-09-22 NOTE — RESULT ENCOUNTER NOTE
Notify Nathalie that her thyroid level is perfect. I'll renew her dose the same.   Gemini Arredondo MD

## 2020-10-02 ENCOUNTER — OFFICE VISIT (OUTPATIENT)
Dept: FAMILY MEDICINE | Facility: CLINIC | Age: 40
End: 2020-10-02
Payer: COMMERCIAL

## 2020-10-02 VITALS
HEART RATE: 76 BPM | TEMPERATURE: 98.5 F | OXYGEN SATURATION: 98 % | WEIGHT: 171.25 LBS | BODY MASS INDEX: 29.63 KG/M2 | SYSTOLIC BLOOD PRESSURE: 100 MMHG | DIASTOLIC BLOOD PRESSURE: 68 MMHG | RESPIRATION RATE: 16 BRPM

## 2020-10-02 DIAGNOSIS — L72.3 INFLAMED SEBACEOUS CYST: Primary | ICD-10-CM

## 2020-10-02 PROCEDURE — 99207 PR DROP WITH A PROCEDURE: CPT | Performed by: FAMILY MEDICINE

## 2020-10-02 PROCEDURE — 11423 EXC H-F-NK-SP B9+MARG 2.1-3: CPT | Performed by: FAMILY MEDICINE

## 2020-10-02 ASSESSMENT — PAIN SCALES - GENERAL: PAINLEVEL: NO PAIN (0)

## 2020-10-09 ENCOUNTER — ALLIED HEALTH/NURSE VISIT (OUTPATIENT)
Dept: FAMILY MEDICINE | Facility: CLINIC | Age: 40
End: 2020-10-09
Payer: COMMERCIAL

## 2020-10-09 VITALS — TEMPERATURE: 97.6 F

## 2020-10-09 DIAGNOSIS — Z48.02 VISIT FOR SUTURE REMOVAL: Primary | ICD-10-CM

## 2020-10-09 PROCEDURE — 99207 PR NO CHARGE NURSE ONLY: CPT

## 2020-10-09 NOTE — PROGRESS NOTES
Patient came in for suture removal from her right inguinal area today.  There is an open area at the bottom of her suture line. Dr. Arredondo consulted. Per Dr. Arredondo sutures should stay in until Monday.     Homa Bloom RN on 10/9/2020 at 7:54 AM

## 2020-10-12 ENCOUNTER — ALLIED HEALTH/NURSE VISIT (OUTPATIENT)
Dept: FAMILY MEDICINE | Facility: CLINIC | Age: 40
End: 2020-10-12
Payer: COMMERCIAL

## 2020-10-12 DIAGNOSIS — Z48.02 ENCOUNTER FOR REMOVAL OF SUTURES: Primary | ICD-10-CM

## 2020-10-12 PROCEDURE — 99207 PR NO CHARGE NURSE ONLY: CPT

## 2020-10-12 NOTE — PROCEDURES
Nathalie Carpenterkaylapura presents to the clinic today for removal of sutures.  The patient has had the sutures in place for 10 days.  There has been no history of infection or drainage.  5 sutures are seen located on the right ingunal.  The wound is healing well with no signs of infection.  Tetanus status is up to date.   All sutures were easily removed today.  Routine wound care discussed.  The patient will follow up as needed.  Closing this encounter.  Sonia Flores, BSN, RN

## 2021-09-19 DIAGNOSIS — F41.8 SITUATIONAL ANXIETY: ICD-10-CM

## 2021-09-22 RX ORDER — SERTRALINE HYDROCHLORIDE 100 MG/1
TABLET, FILM COATED ORAL
Qty: 90 TABLET | Refills: 0 | Status: SHIPPED | OUTPATIENT
Start: 2021-09-22 | End: 2021-09-24

## 2021-09-22 NOTE — TELEPHONE ENCOUNTER
"  Requested Prescriptions   Pending Prescriptions Disp Refills     sertraline (ZOLOFT) 100 MG tablet [Pharmacy Med Name: Sertraline HCl 100 MG Oral Tablet] 90 tablet 0     Sig: Take 1 tablet by mouth once daily   10/2/2020    SSRIs Protocol Passed - 9/19/2021  2:16 PM        Passed - Recent (12 mo) or future (30 days) visit within the authorizing provider's specialty     Patient has had an office visit with the authorizing provider or a provider within the authorizing providers department within the previous 12 mos or has a future within next 30 days. See \"Patient Info\" tab in inbasket, or \"Choose Columns\" in Meds & Orders section of the refill encounter.              Passed - Medication is active on med list        Passed - Patient is age 18 or older        Passed - No active pregnancy on record        Passed - No positive pregnancy test in last 12 months         Prescription approved per [facility] Refill Protocol.  Sara Mock RN              "

## 2021-09-24 ENCOUNTER — OFFICE VISIT (OUTPATIENT)
Dept: FAMILY MEDICINE | Facility: CLINIC | Age: 41
End: 2021-09-24
Payer: COMMERCIAL

## 2021-09-24 VITALS
TEMPERATURE: 97.7 F | BODY MASS INDEX: 32.5 KG/M2 | DIASTOLIC BLOOD PRESSURE: 72 MMHG | OXYGEN SATURATION: 96 % | SYSTOLIC BLOOD PRESSURE: 112 MMHG | HEART RATE: 99 BPM | RESPIRATION RATE: 16 BRPM | HEIGHT: 64 IN | WEIGHT: 190.4 LBS

## 2021-09-24 DIAGNOSIS — F17.200 TOBACCO USE DISORDER: ICD-10-CM

## 2021-09-24 DIAGNOSIS — I47.10 PAROXYSMAL SUPRAVENTRICULAR TACHYCARDIA (H): ICD-10-CM

## 2021-09-24 DIAGNOSIS — L91.8 SKIN TAG: ICD-10-CM

## 2021-09-24 DIAGNOSIS — Z23 NEED FOR INFLUENZA VACCINATION: ICD-10-CM

## 2021-09-24 DIAGNOSIS — D22.9 ATYPICAL MOLE: ICD-10-CM

## 2021-09-24 DIAGNOSIS — Z30.41 ENCOUNTER FOR SURVEILLANCE OF CONTRACEPTIVE PILLS: ICD-10-CM

## 2021-09-24 DIAGNOSIS — E03.8 SUBCLINICAL HYPOTHYROIDISM: ICD-10-CM

## 2021-09-24 DIAGNOSIS — Z00.00 ROUTINE GENERAL MEDICAL EXAMINATION AT A HEALTH CARE FACILITY: Primary | ICD-10-CM

## 2021-09-24 DIAGNOSIS — R09.81 NASAL SINUS CONGESTION: ICD-10-CM

## 2021-09-24 DIAGNOSIS — F41.8 SITUATIONAL ANXIETY: ICD-10-CM

## 2021-09-24 LAB
ALT SERPL W P-5'-P-CCNC: 26 U/L (ref 0–50)
CREAT SERPL-MCNC: 0.85 MG/DL (ref 0.52–1.04)
GFR SERPL CREATININE-BSD FRML MDRD: 87 ML/MIN/1.73M2
TSH SERPL DL<=0.005 MIU/L-ACNC: 2.42 MU/L (ref 0.4–4)

## 2021-09-24 PROCEDURE — 99395 PREV VISIT EST AGE 18-39: CPT | Mod: 25 | Performed by: FAMILY MEDICINE

## 2021-09-24 PROCEDURE — 82565 ASSAY OF CREATININE: CPT | Performed by: FAMILY MEDICINE

## 2021-09-24 PROCEDURE — 90686 IIV4 VACC NO PRSV 0.5 ML IM: CPT | Performed by: FAMILY MEDICINE

## 2021-09-24 PROCEDURE — 36415 COLL VENOUS BLD VENIPUNCTURE: CPT | Performed by: FAMILY MEDICINE

## 2021-09-24 PROCEDURE — 84460 ALANINE AMINO (ALT) (SGPT): CPT | Performed by: FAMILY MEDICINE

## 2021-09-24 PROCEDURE — 90471 IMMUNIZATION ADMIN: CPT | Performed by: FAMILY MEDICINE

## 2021-09-24 PROCEDURE — 84443 ASSAY THYROID STIM HORMONE: CPT | Performed by: FAMILY MEDICINE

## 2021-09-24 RX ORDER — NORETHINDRONE ACETATE AND ETHINYL ESTRADIOL .02; 1 MG/1; MG/1
1 TABLET ORAL DAILY
Qty: 112 TABLET | Refills: 3 | Status: SHIPPED | OUTPATIENT
Start: 2021-09-24 | End: 2022-12-22

## 2021-09-24 RX ORDER — LEVOTHYROXINE SODIUM 50 UG/1
TABLET ORAL
Qty: 93 TABLET | Refills: 3 | Status: SHIPPED | OUTPATIENT
Start: 2021-09-24 | End: 2022-06-23

## 2021-09-24 RX ORDER — SERTRALINE HYDROCHLORIDE 100 MG/1
100 TABLET, FILM COATED ORAL DAILY
Qty: 93 TABLET | Refills: 3 | Status: SHIPPED | OUTPATIENT
Start: 2021-09-24 | End: 2022-12-22

## 2021-09-24 RX ORDER — ATENOLOL 25 MG/1
25 TABLET ORAL DAILY
Qty: 93 TABLET | Refills: 3 | Status: SHIPPED | OUTPATIENT
Start: 2021-09-24 | End: 2023-02-27

## 2021-09-24 RX ORDER — FLUTICASONE PROPIONATE 50 MCG
2 SPRAY, SUSPENSION (ML) NASAL DAILY PRN
Qty: 16 G | Refills: 3 | Status: SHIPPED | OUTPATIENT
Start: 2021-09-24 | End: 2022-12-22

## 2021-09-24 ASSESSMENT — ENCOUNTER SYMPTOMS
PALPITATIONS: 0
JOINT SWELLING: 0
FEVER: 0
ARTHRALGIAS: 0
FREQUENCY: 0
NAUSEA: 0
HEMATOCHEZIA: 0
EYE PAIN: 0
WEAKNESS: 0
DIARRHEA: 0
CHILLS: 0
DYSURIA: 0
HEMATURIA: 0
CONSTIPATION: 0
PARESTHESIAS: 0
NERVOUS/ANXIOUS: 0
BREAST MASS: 0
HEADACHES: 0
SHORTNESS OF BREATH: 0
COUGH: 0
DIZZINESS: 0
ABDOMINAL PAIN: 0
MYALGIAS: 0
SORE THROAT: 0
HEARTBURN: 1

## 2021-09-24 ASSESSMENT — MIFFLIN-ST. JEOR: SCORE: 1520.71

## 2021-09-24 ASSESSMENT — PAIN SCALES - GENERAL: PAINLEVEL: NO PAIN (0)

## 2021-09-24 NOTE — PROGRESS NOTES
Prior to immunization administration, verified patients identity using patient s name and date of birth. Please see Immunization Activity for additional information.     Screening Questionnaire for Adult Immunization    Are you sick today?   No   Do you have allergies to medications, food, a vaccine component or latex?   No   Have you ever had a serious reaction after receiving a vaccination?   No   Do you have a long-term health problem with heart, lung, kidney, or metabolic disease (e.g., diabetes), asthma, a blood disorder, no spleen, complement component deficiency, a cochlear implant, or a spinal fluid leak?  Are you on long-term aspirin therapy?   No   Do you have cancer, leukemia, HIV/AIDS, or any other immune system problem?   No   Do you have a parent, brother, or sister with an immune system problem?   No   In the past 3 months, have you taken medications that affect  your immune system, such as prednisone, other steroids, or anticancer drugs; drugs for the treatment of rheumatoid arthritis, Crohn s disease, or psoriasis; or have you had radiation treatments?   No   Have you had a seizure, or a brain or other nervous system problem?   No   During the past year, have you received a transfusion of blood or blood    products, or been given immune (gamma) globulin or antiviral drug?   No   For women: Are you pregnant or is there a chance you could become       pregnant during the next month?   No   Have you received any vaccinations in the past 4 weeks?   No     Immunization questionnaire answers were all negative.        Per orders of Dr. Arredondo, injection of flu given by Stacy Morgan. Patient instructed to remain in clinic for 15 minutes afterwards, and to report any adverse reaction to me immediately.       Screening performed by Stacy Morgan on 9/24/2021 at 10:06 AM.

## 2021-09-24 NOTE — PROGRESS NOTES
SUBJECTIVE:   CC: Nathalie Younger is an 40 year old woman who presents for preventive health visit.     Patient has been advised of split billing requirements and indicates understanding: Yes  Healthy Habits:     Getting at least 3 servings of Calcium per day:  Yes    Bi-annual eye exam:  NO    Dental care twice a year:  NO    Sleep apnea or symptoms of sleep apnea:  None    Diet:  Regular (no restrictions)    Frequency of exercise:  6-7 days/week    Duration of exercise:  15-30 minutes    Taking medications regularly:  No    Barriers to taking medications:  None    Medication side effects:  None    PHQ-2 Total Score: 0    Additional concerns today:  Yes        Today's PHQ-2 Score:   PHQ-2 ( 1999 [employer]) 9/21/2020   Q1: Little interest or pleasure in doing things 0   Q2: Feeling down, depressed or hopeless 0   PHQ-2 Score 0   Q1: Little interest or pleasure in doing things Not at all   Q2: Feeling down, depressed or hopeless Not at all   PHQ-2 Score 0       Abuse: Current or Past (Physical, Sexual or Emotional) - No  Do you feel safe in your environment? Yes    Have you ever done Advance Care Planning? (For example, a Health Directive, POLST, or a discussion with a medical provider or your loved ones about your wishes): No, advance care planning information given to patient to review.  Patient declined advance care planning discussion at this time.    Social History     Tobacco Use     Smoking status: Current Every Day Smoker     Packs/day: 0.50     Years: 3.00     Pack years: 1.50     Types: Cigarettes     Smokeless tobacco: Never Used   Substance Use Topics     Alcohol use: Yes     Comment: occasionally     If you drink alcohol do you typically have >3 drinks per day or >7 drinks per week? No    Alcohol Use 9/21/2020   Prescreen: >3 drinks/day or >7 drinks/week? No   Prescreen: >3 drinks/day or >7 drinks/week? -   No flowsheet data found.    Reviewed orders with patient.  Reviewed health maintenance and  updated orders accordingly - Yes  BP Readings from Last 3 Encounters:   09/24/21 112/72   10/02/20 100/68   09/21/20 110/78    Wt Readings from Last 3 Encounters:   09/24/21 86.4 kg (190 lb 6.4 oz)   10/02/20 77.7 kg (171 lb 4 oz)   09/21/20 77.1 kg (170 lb)                  Patient Active Problem List   Diagnosis     Tobacco use disorder     Headache     CARDIOVASCULAR SCREENING; LDL GOAL LESS THAN 160     Subclinical hypothyroidism     Past Surgical History:   Procedure Laterality Date     NO HISTORY OF SURGERY         Social History     Tobacco Use     Smoking status: Current Every Day Smoker     Packs/day: 0.50     Years: 3.00     Pack years: 1.50     Types: Cigarettes     Smokeless tobacco: Never Used   Substance Use Topics     Alcohol use: Yes     Comment: occasionally     Family History   Problem Relation Age of Onset     Breast Cancer Mother 53     Hypertension Father      Prostate Cancer Father 50        negative testing for cancer genes     Cancer Father         kidney, metastatic to bone.      Hypertension Sister      Asthma Brother      Cancer Maternal Grandmother         lung     Cancer Maternal Grandfather         lung and bone     Cancer Paternal Grandfather         stomach     Cancer Maternal Uncle 50        rectal     Cancer Paternal Aunt 56        cervical     Cancer Paternal Aunt 56        ovarian           Breast Cancer Screening:  Any new diagnosis of family breast, ovarian, or bowel cancer? No    FHS-7: No flowsheet data found.    Patient under 40 years of age: Routine Mammogram Screening not recommended.   Pertinent mammograms are reviewed under the imaging tab.    History of abnormal Pap smear: NO - age 30- 65 PAP every 3 years recommended  PAP / HPV Latest Ref Rng & Units 9/13/2019 10/31/2016 6/3/2013   PAP (Historical) - NIL NIL NIL   HPV16 NEG:Negative Negative Negative -   HPV18 NEG:Negative Negative Negative -   HRHPV NEG:Negative Negative Negative -     Reviewed and updated as needed  "this visit by clinical staff  Tobacco  Allergies  Meds   Med Hx  Surg Hx  Fam Hx  Soc Hx        Reviewed and updated as needed this visit by Provider                    Review of Systems   Constitutional: Negative for chills and fever.   HENT: Negative for congestion, ear pain, hearing loss and sore throat.    Eyes: Negative for pain and visual disturbance.   Respiratory: Negative for cough and shortness of breath.    Cardiovascular: Negative for chest pain, palpitations and peripheral edema.   Gastrointestinal: Positive for heartburn. Negative for abdominal pain, constipation, diarrhea, hematochezia and nausea.   Breasts:  Negative for tenderness, breast mass and discharge.   Genitourinary: Negative for dysuria, frequency, genital sores, hematuria, pelvic pain, urgency, vaginal bleeding and vaginal discharge.   Musculoskeletal: Negative for arthralgias, joint swelling and myalgias.   Skin: Negative for rash.   Neurological: Negative for dizziness, weakness, headaches and paresthesias.   Psychiatric/Behavioral: Negative for mood changes. The patient is not nervous/anxious.      She has a couple moles she wants looked at.   She has the \"water shits\". She was doing slim fast and benefiber  Periods are normal on OCPs.   Is on ATenolol, gets occasional heart palpitations but nothing bad.      OBJECTIVE:   /72   Pulse 99   Temp 97.7  F (36.5  C) (Temporal)   Resp 16   Ht 1.613 m (5' 3.5\")   Wt 86.4 kg (190 lb 6.4 oz)   LMP 09/18/2021   SpO2 96%   BMI 33.20 kg/m    Physical Exam  GENERAL: healthy, alert and no distress  EYES: Eyes grossly normal to inspection, PERRL and conjunctivae and sclerae normal  HENT: ear canals and TM's normal, nose and mouth without ulcers or lesions  NECK: no adenopathy, no asymmetry, masses, or scars and thyroid normal to palpation  RESP: lungs clear to auscultation - no rales, rhonchi or wheezes  BREAST: normal without masses, tenderness or nipple discharge and no palpable " axillary masses or adenopathy  CV: regular rate and rhythm with occasional extra beat heard on exam, normal S1 S2, no S3 or S4, no murmur, click or rub, no peripheral edema and peripheral pulses strong  ABDOMEN: soft, nontender, no hepatosplenomegaly, no masses and bowel sounds normal  MS: no gross musculoskeletal defects noted, no edema  SKIN: She has a dark brown flat mole, 6 mm in size, under the left breast.  She has a 5 to 6 mm mole under the medial side of the left breast which looks slightly atypical.  She has a flat brown 6 to 7 mm mole under the medial edge of the right breast as well.  She has 3 skin tags, a 5 to 6 mm on the lower back, and 2 similar sized ones on the back of her neck.  NEURO: Normal strength and tone, mentation intact and speech normal  PSYCH: mentation appears normal, affect normal/bright    Diagnostic Test Results:  Orders Placed This Encounter   Procedures     REVIEW OF HEALTH MAINTENANCE PROTOCOL ORDERS     FLU VACCINE, 3 YRS +, IM (FLUZONE)     TSH with free T4 reflex     ALT     Creatinine        ASSESSMENT/PLAN:       ICD-10-CM    1. Routine general medical examination at a health care facility  Z00.00    2. Paroxysmal supraventricular tachycardia (H)  I47.1 atenolol (TENORMIN) 25 MG tablet     ALT     Creatinine     Creatinine     ALT   3. Subclinical hypothyroidism  E03.9 levothyroxine (SYNTHROID/LEVOTHROID) 50 MCG tablet     TSH with free T4 reflex     TSH with free T4 reflex   4. Situational anxiety  F41.8 sertraline (ZOLOFT) 100 MG tablet     Creatinine     Creatinine   5. Nasal sinus congestion  R09.81 fluticasone (FLONASE) 50 MCG/ACT nasal spray   6. Encounter for surveillance of contraceptive pills  Z30.41 norethindrone-ethinyl estradiol (MICROGESTIN) 1-20 MG-MCG tablet   7. Need for influenza vaccination  Z23 FLU VACCINE, 3 YRS +, IM (FLUZONE)   8. Skin tag  L91.8    9. Atypical mole  D22.9    10. Tobacco use disorder  F17.200        Patient has been advised of split  "billing requirements and indicates understanding: Yes  I recommend a yearly physical, monthly self breast exam.  Pap smear every 3 years.  Due next year.  We will notify her with lab results.  Refilled her medications for 1 year.  No change.    I do recommend she come in for mole removal for the 2 moles, one under the right breast medial and and one under the medial end of the left breast because of their atypical appearance.    COUNSELING:  Reviewed preventive health counseling, as reflected in patient instructions  Special attention given to:        Regular exercise       Healthy diet/nutrition       Vision screening       Immunizations    Vaccinated for: Influenza             Contraception refilled OCPs    Estimated body mass index is 33.2 kg/m  as calculated from the following:    Height as of this encounter: 1.613 m (5' 3.5\").    Weight as of this encounter: 86.4 kg (190 lb 6.4 oz).    Weight management plan: Discussed healthy diet and exercise guidelines  Weight watchers.     She reports that she has been smoking cigarettes. She has a 1.50 pack-year smoking history. She has never used smokeless tobacco.  Tobacco Cessation Action Plan:   Self help information given to patient      Counseling Resources:  ATP IV Guidelines  Pooled Cohorts Equation Calculator  Breast Cancer Risk Calculator  BRCA-Related Cancer Risk Assessment: FHS-7 Tool  FRAX Risk Assessment  ICSI Preventive Guidelines  Dietary Guidelines for Americans, 2010  USDA's MyPlate  ASA Prophylaxis  Lung CA Screening    Gemini Arredondo MD  [facility]  "

## 2021-11-19 ENCOUNTER — OFFICE VISIT (OUTPATIENT)
Dept: FAMILY MEDICINE | Facility: CLINIC | Age: 41
End: 2021-11-19
Payer: COMMERCIAL

## 2021-11-19 VITALS
SYSTOLIC BLOOD PRESSURE: 110 MMHG | DIASTOLIC BLOOD PRESSURE: 80 MMHG | WEIGHT: 191.25 LBS | BODY MASS INDEX: 33.35 KG/M2 | TEMPERATURE: 98.2 F | HEART RATE: 91 BPM | RESPIRATION RATE: 20 BRPM | OXYGEN SATURATION: 96 %

## 2021-11-19 DIAGNOSIS — D22.9 ATYPICAL MOLE: ICD-10-CM

## 2021-11-19 PROBLEM — R09.81 NASAL SINUS CONGESTION: Status: ACTIVE | Noted: 2021-11-19

## 2021-11-19 PROBLEM — Z30.41 ENCOUNTER FOR SURVEILLANCE OF CONTRACEPTIVE PILLS: Status: ACTIVE | Noted: 2021-11-19

## 2021-11-19 PROBLEM — I47.10 PAROXYSMAL SUPRAVENTRICULAR TACHYCARDIA (H): Status: ACTIVE | Noted: 2021-11-19

## 2021-11-19 PROBLEM — F41.8 SITUATIONAL ANXIETY: Status: ACTIVE | Noted: 2021-11-19

## 2021-11-19 PROCEDURE — 11401 EXC TR-EXT B9+MARG 0.6-1 CM: CPT | Performed by: FAMILY MEDICINE

## 2021-11-19 PROCEDURE — 88305 TISSUE EXAM BY PATHOLOGIST: CPT | Performed by: DERMATOLOGY

## 2021-11-19 ASSESSMENT — PAIN SCALES - GENERAL: PAINLEVEL: NO PAIN (0)

## 2021-11-19 NOTE — PROGRESS NOTES
SUBJECTIVE:  Nathalie Younger is a 41 year old female who presents for lesion removal. These lesions have been present for a long time but are changing and growing.    OBJECTIVE:  Vitals noted.  Patient alert, oriented, and in no acute distress.   On the right posterior neck, she has a 5-6 mm raised pedunculated flesh colored skin tag.   On the posterior neck at C7 level, she has a 5-6 mm pedunculated flesh colored skin tag with slight pigment.   On the right low back she has a 6 to 7 mm raised lesion, dark pigmented with the color leaching slightly onto the surrounding skin.  Under the medial aspect of the left breast, she has a raised flesh-colored skin tag, 6 mm in diameter.  Appears completely benign today.  Also under the medial aspect of the right breast, she has a slightly raised pigmented lesion that may be a skin tag but could be a pigmented mole as well.  7 mm in diameter.      ASSESSMENT:    ICD-10-CM    1. Atypical mole  D22.9 Dermatological Path Order and Indications     Dermatological Path Order and Indications     EXC BENIGN SKIN LESION TRUNK/ARM/LEG 0.6-1.0 CM        PLAN:  I removed a total of 5 lesions. 3 are skin tags and were not sent for pathology. Two were possibly skin tags but more pigmented than expected so sent for pathology to rule out other lesions. The lesion on the right lower back and the lesion under right breast were sent for pathology.   All 5 lesions were removed with the following procedure:    After informed consent was obtained, using alcohol for cleansing followed by 1% Lidocaine with epinephrine for anesthetic and then Betadine x 3 for sterile prep, with sterile technique shave excision in total was performed with a 1-2 mm margin.  Drysol used with excellent hemostasis. Antibiotic dressing is applied, and wound care instructions provided.  Be alert for any signs of cutaneous infection.  The procedure was well tolerated without complications.    Will notify with pathology  results.   Discussed skin care, follow up prn.     Gemini Arredondo MD

## 2021-11-24 LAB
PATH REPORT.COMMENTS IMP SPEC: NORMAL
PATH REPORT.COMMENTS IMP SPEC: NORMAL
PATH REPORT.FINAL DX SPEC: NORMAL
PATH REPORT.GROSS SPEC: NORMAL
PATH REPORT.MICROSCOPIC SPEC OTHER STN: NORMAL
PATH REPORT.RELEVANT HX SPEC: NORMAL

## 2021-11-29 ENCOUNTER — TELEPHONE (OUTPATIENT)
Dept: FAMILY MEDICINE | Facility: CLINIC | Age: 41
End: 2021-11-29
Payer: COMMERCIAL

## 2021-11-29 NOTE — TELEPHONE ENCOUNTER
----- Message from Gemini Arredondo MD sent at 11/29/2021  7:21 AM CST -----  Notify her that both of her moles are benign (non-cancerous).   Gemini Arredondo MD

## 2022-06-21 DIAGNOSIS — E03.8 SUBCLINICAL HYPOTHYROIDISM: ICD-10-CM

## 2022-06-23 RX ORDER — LEVOTHYROXINE SODIUM 50 UG/1
TABLET ORAL
Qty: 93 TABLET | Refills: 3 | Status: SHIPPED | OUTPATIENT
Start: 2022-06-23 | End: 2022-12-22

## 2022-11-16 ENCOUNTER — TELEPHONE (OUTPATIENT)
Dept: FAMILY MEDICINE | Facility: CLINIC | Age: 42
End: 2022-11-16

## 2022-12-10 NOTE — NURSING NOTE
"Chief Complaint   Patient presents with     Recheck Medication     Thyroid       Initial /64 (BP Location: Left arm, Patient Position: Chair, Cuff Size: Adult Regular)  Pulse 105  Temp 97.7  F (36.5  C) (Temporal)  Ht 5' 3\" (1.6 m)  Wt 162 lb 8 oz (73.7 kg)  SpO2 99%  BMI 28.79 kg/m2 Estimated body mass index is 28.79 kg/(m^2) as calculated from the following:    Height as of this encounter: 5' 3\" (1.6 m).    Weight as of this encounter: 162 lb 8 oz (73.7 kg).  Medication Reconciliation: complete  Goldie Aguilar MA  " Ambulatory

## 2022-12-17 DIAGNOSIS — E03.8 SUBCLINICAL HYPOTHYROIDISM: ICD-10-CM

## 2022-12-17 DIAGNOSIS — F41.8 SITUATIONAL ANXIETY: ICD-10-CM

## 2022-12-17 DIAGNOSIS — Z30.41 ENCOUNTER FOR SURVEILLANCE OF CONTRACEPTIVE PILLS: ICD-10-CM

## 2022-12-17 DIAGNOSIS — R09.81 NASAL SINUS CONGESTION: ICD-10-CM

## 2022-12-20 NOTE — TELEPHONE ENCOUNTER
"Flonase  sandra  Levothyroxine  zoloft    Future Appointments 12/20/2022 - 6/18/2023      Date Visit Type Length Department Provider     2/27/2023  8:00 AM PREVENTATIVE ADULT            40 min [facility] Gemini Arredondo MD    Location Instructions:     [facility] is located at [address], within [facility]. This is near the [address] exit off of [address]. Turn north off the exit, then west onto [address]. Park in the Blue Lot. The [facility] and [facility] center share a main entrance and .                    Requested Prescriptions   Pending Prescriptions Disp Refills     sertraline (ZOLOFT) 100 MG tablet [Pharmacy Med Name: Sertraline HCl 100 MG Oral Tablet] 9 tablet 0     Sig: Take 1 tablet by mouth once daily       SSRIs Protocol Failed - 12/17/2022  5:01 PM        Failed - Recent (12 mo) or future (30 days) visit within the authorizing provider's specialty     Patient has had an office visit with the authorizing provider or a provider within the authorizing providers department within the previous 12 mos or has a future within next 30 days. See \"Patient Info\" tab in inbasket, or \"Choose Columns\" in Meds & Orders section of the refill encounter.              Passed - Medication is active on med list        Passed - Patient is age 18 or older        Passed - No active pregnancy on record        Passed - No positive pregnancy test in last 12 months           levothyroxine (SYNTHROID/LEVOTHROID) 50 MCG tablet [Pharmacy Med Name: Levothyroxine Sodium 50 MCG Oral Tablet] 9 tablet 0     Sig: Take 1 tablet by mouth once daily       Thyroid Protocol Failed - 12/17/2022  5:01 PM        Failed - Recent (12 mo) or future (30 days) visit within the authorizing provider's specialty     Patient has had an office visit with the authorizing provider or a provider within the authorizing providers department " "within the previous 12 mos or has a future within next 30 days. See \"Patient Info\" tab in inbasket, or \"Choose Columns\" in Meds & Orders section of the refill encounter.              Failed - Normal TSH on file in past 12 months     Recent Labs   Lab Test 09/24/21  1008   TSH 2.42              Passed - Patient is 12 years or older        Passed - Medication is active on med list        Passed - No active pregnancy on record     If patient is pregnant or has had a positive pregnancy test, please check TSH.          Passed - No positive pregnancy test in past 12 months     If patient is pregnant or has had a positive pregnancy test, please check TSH.             MONA FE 1/20 1-20 MG-MCG tablet [Pharmacy Med Name: Mona Fe 1/20 1-20 MG-MCG Oral Tablet] 28 tablet 0     Sig: TAKE 1 TABLET BY MOUTH ONCE DAILY CONTINUOUSLY  FOR  12  WEEKS  BEFORE  1  WEEK  OF  PLACEBOS       Contraceptives Protocol Failed - 12/17/2022  5:01 PM        Failed - Patient is not a current smoker if age is 35 or older        Failed - Recent (12 mo) or future (30 days) visit within the authorizing provider's specialty     Patient has had an office visit with the authorizing provider or a provider within the authorizing providers department within the previous 12 mos or has a future within next 30 days. See \"Patient Info\" tab in inSpartoosket, or \"Choose Columns\" in Meds & Orders section of the refill encounter.              Failed - Medication is active on med list        Passed - No active pregnancy on record        Passed - No positive pregnancy test in past 12 months           fluticasone (FLONASE) 50 MCG/ACT nasal spray [Pharmacy Med Name: Fluticasone Propionate 50 MCG/ACT Nasal Suspension] 16 g 0     Sig: USE 2 SPRAY(S) IN EACH NOSTRIL ONCE DAILY AS NEEDED FOR  RHINITIS  OR  ALLERGIES       Nasal Allergy Protocol Failed - 12/17/2022  5:01 PM        Failed - Recent (12 mo) or future (30 days) visit within the authorizing provider's specialty     " "Patient has had an office visit with the authorizing provider or a provider within the authorizing providers department within the previous 12 mos or has a future within next 30 days. See \"Patient Info\" tab in inbasket, or \"Choose Columns\" in Meds & Orders section of the refill encounter.              Passed - Patient is age 12 or older        Passed - Medication is active on med list             "

## 2022-12-22 RX ORDER — NORETHINDRONE ACETATE/ETHINYL ESTRADIOL AND FERROUS FUMARATE 1MG-20(21)
KIT ORAL
Qty: 84 TABLET | Refills: 0 | Status: SHIPPED | OUTPATIENT
Start: 2022-12-22 | End: 2023-02-27

## 2022-12-22 RX ORDER — LEVOTHYROXINE SODIUM 50 UG/1
TABLET ORAL
Qty: 90 TABLET | Refills: 0 | Status: SHIPPED | OUTPATIENT
Start: 2022-12-22 | End: 2023-03-06

## 2022-12-22 RX ORDER — FLUTICASONE PROPIONATE 50 MCG
SPRAY, SUSPENSION (ML) NASAL
Qty: 16 G | Refills: 0 | Status: SHIPPED | OUTPATIENT
Start: 2022-12-22 | End: 2024-03-20

## 2022-12-22 RX ORDER — SERTRALINE HYDROCHLORIDE 100 MG/1
TABLET, FILM COATED ORAL
Qty: 90 TABLET | Refills: 0 | Status: SHIPPED | OUTPATIENT
Start: 2022-12-22 | End: 2023-02-27

## 2023-02-27 ENCOUNTER — OFFICE VISIT (OUTPATIENT)
Dept: FAMILY MEDICINE | Facility: CLINIC | Age: 43
End: 2023-02-27
Payer: COMMERCIAL

## 2023-02-27 VITALS
WEIGHT: 195 LBS | OXYGEN SATURATION: 97 % | BODY MASS INDEX: 32.49 KG/M2 | TEMPERATURE: 97.2 F | HEART RATE: 90 BPM | DIASTOLIC BLOOD PRESSURE: 72 MMHG | SYSTOLIC BLOOD PRESSURE: 104 MMHG | HEIGHT: 65 IN

## 2023-02-27 DIAGNOSIS — E03.8 SUBCLINICAL HYPOTHYROIDISM: ICD-10-CM

## 2023-02-27 DIAGNOSIS — Z00.00 ROUTINE PHYSICAL EXAMINATION: Primary | ICD-10-CM

## 2023-02-27 DIAGNOSIS — Z13.6 CARDIOVASCULAR SCREENING; LDL GOAL LESS THAN 160: ICD-10-CM

## 2023-02-27 DIAGNOSIS — Z12.4 CERVICAL CANCER SCREENING: ICD-10-CM

## 2023-02-27 DIAGNOSIS — E66.811 CLASS 1 OBESITY DUE TO EXCESS CALORIES WITHOUT SERIOUS COMORBIDITY WITH BODY MASS INDEX (BMI) OF 32.0 TO 32.9 IN ADULT: ICD-10-CM

## 2023-02-27 DIAGNOSIS — I47.10 PAROXYSMAL SUPRAVENTRICULAR TACHYCARDIA (H): ICD-10-CM

## 2023-02-27 DIAGNOSIS — Z12.31 ENCOUNTER FOR SCREENING MAMMOGRAM FOR MALIGNANT NEOPLASM OF BREAST: ICD-10-CM

## 2023-02-27 DIAGNOSIS — F41.8 SITUATIONAL ANXIETY: ICD-10-CM

## 2023-02-27 DIAGNOSIS — E66.09 CLASS 1 OBESITY DUE TO EXCESS CALORIES WITHOUT SERIOUS COMORBIDITY WITH BODY MASS INDEX (BMI) OF 32.0 TO 32.9 IN ADULT: ICD-10-CM

## 2023-02-27 LAB
CHOLEST SERPL-MCNC: 198 MG/DL
HDLC SERPL-MCNC: 45 MG/DL
LDLC SERPL CALC-MCNC: 123 MG/DL
NONHDLC SERPL-MCNC: 153 MG/DL
TRIGL SERPL-MCNC: 150 MG/DL
TSH SERPL DL<=0.005 MIU/L-ACNC: 3.39 UIU/ML (ref 0.3–4.2)

## 2023-02-27 PROCEDURE — 99396 PREV VISIT EST AGE 40-64: CPT | Performed by: FAMILY MEDICINE

## 2023-02-27 PROCEDURE — G0145 SCR C/V CYTO,THINLAYER,RESCR: HCPCS | Performed by: FAMILY MEDICINE

## 2023-02-27 PROCEDURE — 87624 HPV HI-RISK TYP POOLED RSLT: CPT | Performed by: FAMILY MEDICINE

## 2023-02-27 PROCEDURE — 99213 OFFICE O/P EST LOW 20 MIN: CPT | Mod: 25 | Performed by: FAMILY MEDICINE

## 2023-02-27 PROCEDURE — 84443 ASSAY THYROID STIM HORMONE: CPT | Performed by: FAMILY MEDICINE

## 2023-02-27 PROCEDURE — 80061 LIPID PANEL: CPT | Performed by: FAMILY MEDICINE

## 2023-02-27 PROCEDURE — 36415 COLL VENOUS BLD VENIPUNCTURE: CPT | Performed by: FAMILY MEDICINE

## 2023-02-27 RX ORDER — ATENOLOL 25 MG/1
25 TABLET ORAL DAILY
Qty: 93 TABLET | Refills: 3 | Status: SHIPPED | OUTPATIENT
Start: 2023-02-27 | End: 2024-03-05

## 2023-02-27 RX ORDER — SERTRALINE HYDROCHLORIDE 100 MG/1
100 TABLET, FILM COATED ORAL DAILY
Qty: 93 TABLET | Refills: 3 | Status: SHIPPED | OUTPATIENT
Start: 2023-02-27 | End: 2024-03-06

## 2023-02-27 ASSESSMENT — ENCOUNTER SYMPTOMS
EYE PAIN: 0
DIZZINESS: 0
SORE THROAT: 0
NERVOUS/ANXIOUS: 0
JOINT SWELLING: 0
DYSURIA: 0
PARESTHESIAS: 0
MYALGIAS: 0
SHORTNESS OF BREATH: 0
ABDOMINAL PAIN: 0
HEADACHES: 0
ARTHRALGIAS: 1
FREQUENCY: 0
CHILLS: 0
HEARTBURN: 0
FEVER: 0
HEMATOCHEZIA: 0
DIARRHEA: 0
COUGH: 0
HEMATURIA: 0
NAUSEA: 0
WEAKNESS: 0
PALPITATIONS: 0
CONSTIPATION: 0

## 2023-02-27 NOTE — PROGRESS NOTES
SUBJECTIVE:   CC: Nathalie is an 42 year old who presents for preventive health visit.   Patient has been advised of split billing requirements and indicates understanding: Yes  Healthy Habits:   PHQ-2 Total Score: 0      Today's PHQ-2 Score:   PHQ-2 ( 1999 [employer]) 2/27/2023   Q1: Little interest or pleasure in doing things 0   Q2: Feeling down, depressed or hopeless 0   PHQ-2 Score 0   PHQ-2 Total Score (12-17 Years)- Positive if 3 or more points; Administer PHQ-A if positive -   Q1: Little interest or pleasure in doing things Not at all   Q2: Feeling down, depressed or hopeless Not at all   PHQ-2 Score 0           Social History     Tobacco Use     Smoking status: Every Day     Packs/day: 0.50     Years: 3.00     Pack years: 1.50     Types: Cigarettes     Smokeless tobacco: Never   Substance Use Topics     Alcohol use: Yes     Comment: occasionally     If you drink alcohol do you typically have >3 drinks per day or >7 drinks per week? No    Alcohol Use 2/27/2023   Prescreen: >3 drinks/day or >7 drinks/week? No   Prescreen: >3 drinks/day or >7 drinks/week? -       Reviewed orders with patient.  Reviewed health maintenance and updated orders accordingly - Yes  BP Readings from Last 3 Encounters:   02/27/23 104/72   11/19/21 110/80   09/24/21 112/72    Wt Readings from Last 3 Encounters:   02/27/23 88.5 kg (195 lb)   11/19/21 86.8 kg (191 lb 4 oz)   09/24/21 86.4 kg (190 lb 6.4 oz)                  Patient Active Problem List   Diagnosis     Tobacco use disorder     Headache     CARDIOVASCULAR SCREENING; LDL GOAL LESS THAN 160     Subclinical hypothyroidism     Encounter for surveillance of contraceptive pills     Nasal sinus congestion     Situational anxiety     Paroxysmal supraventricular tachycardia (H)     Class 1 obesity due to excess calories without serious comorbidity with body mass index (BMI) of 32.0 to 32.9 in adult     Past Surgical History:   Procedure Laterality Date     NO HISTORY OF SURGERY          Social History     Tobacco Use     Smoking status: Every Day     Packs/day: 0.50     Years: 3.00     Pack years: 1.50     Types: Cigarettes     Smokeless tobacco: Never   Substance Use Topics     Alcohol use: Yes     Comment: occasionally     Family History   Problem Relation Age of Onset     Breast Cancer Mother 53     Hypertension Father      Prostate Cancer Father 50        negative testing for cancer genes     Cancer Father         kidney, metastatic to bone.      Hypertension Sister      Asthma Brother      Cancer Maternal Grandmother         lung     Cancer Maternal Grandfather         lung and bone     Cancer Paternal Grandfather         stomach     Cancer Maternal Uncle 50        rectal     Cancer Paternal Aunt 56        cervical     Cancer Paternal Aunt 56        ovarian           Breast Cancer Screening:    FHS-7:   Breast CA Risk Assessment (FHS-7) 2/27/2023   Did any of your first-degree relatives have breast or ovarian cancer? Yes   Did any of your relatives have bilateral breast cancer? Unknown   Did any man in your family have breast cancer? No   Did any woman in your family have breast and ovarian cancer? Yes   Did any woman in your family have breast cancer before age 50 y? No   Do you have 2 or more relatives with breast and/or ovarian cancer? Yes   Do you have 2 or more relatives with breast and/or bowel cancer? Yes     Mammogram Screening - Offered annual screening and updated Health Maintenance for mutual plan based on risk factor consideration    Pertinent mammograms are reviewed under the imaging tab.    History of abnormal Pap smear: NO - age 30- 65 PAP every 3 years recommended  PAP / HPV Latest Ref Rng & Units 9/13/2019 10/31/2016 6/3/2013   PAP (Historical) - NIL NIL NIL   HPV16 NEG:Negative Negative Negative -   HPV18 NEG:Negative Negative Negative -   HRHPV NEG:Negative Negative Negative -     Reviewed and updated as needed this visit by clinical staff   Tobacco  Allergies  Meds    "Med Hx  Surg Hx  Fam Hx  Soc Hx        Reviewed and updated as needed this visit by Provider   Tobacco  Allergies  Meds   Med Hx  Surg Hx  Fam Hx  Soc Hx           Review of Systems  CONSTITUTIONAL: NEGATIVE for fever, chills, has gained weight.   INTEGUMENTARU/SKIN: NEGATIVE for worrisome rashes, moles or lesions, just getting more skin tags.  Has a tiny bump on the inside of her right thigh that feels firm and she tries to scratch it off but it does not change.  EYES: NEGATIVE for vision changes or irritation  ENT: NEGATIVE for ear, mouth and throat problems  RESP: NEGATIVE for significant cough or SOB  BREAST: NEGATIVE for masses, tenderness or discharge  CV: NEGATIVE for chest pain, palpitations or peripheral edema.  Is taking her a atenolol regularly and it helps fine.  GI: NEGATIVE for nausea, abdominal pain, heartburn, or change in bowel habits  : NEGATIVE for unusual urinary or vaginal symptoms. Periods are regular.  She stopped taking her birth control pills because she was feeling warm all the time with them.  It is somewhat better with stopping them.  She is sexually active with 1 long-term partner and would be okay with pregnancy if it happened. Using condoms   MUSCULOSKELETAL: NEGATIVE for significant arthralgias or myalgia  NEURO: NEGATIVE for weakness, dizziness or paresthesias  PSYCHIATRIC: NEGATIVE for changes in mood or affect.  Her sertraline is working well and she would like to stay on the same dose.  Endocrine: She is taking her thyroid medication as prescribed.  She is wondering about switching to liothyronine because she has heard it would help better with weight loss.     OBJECTIVE:   /72   Pulse 90   Temp 97.2  F (36.2  C) (Temporal)   Ht 1.638 m (5' 4.5\")   Wt 88.5 kg (195 lb)   LMP 01/09/2023 (Approximate)   SpO2 97%   BMI 32.95 kg/m    Physical Exam  GENERAL: healthy, alert and no distress  EYES: Eyes grossly normal to inspection, PERRL and conjunctivae and " sclerae normal  HENT: ear canals and TM's normal, nose and mouth without ulcers or lesions  NECK: no adenopathy, no asymmetry, masses, or scars and thyroid normal to palpation, no bruits.  RESP: lungs clear to auscultation - no rales, rhonchi or wheezes  BREAST: normal without masses, tenderness or nipple discharge and no palpable axillary masses or adenopathy  CV: regular rate and rhythm, normal S1 S2, no S3 or S4, no murmur, click or rub, no peripheral edema and peripheral pulses strong  ABDOMEN: soft, nontender, no hepatosplenomegaly, no masses and bowel sounds normal   (female): Vaginal exam reveals normal external and internal genitalia.  Cervix is closed, long and thick.  No lesions or abnormalities seen.  Pap co-test collected. Bimanual exam reveals a nontender, nongravid uterus with no CMT.  No adnexal masses or tenderness.     MS: no gross musculoskeletal defects noted, no edema  SKIN: no suspicious lesions or rashes, small fibrous bump on the inside of her right proximal thigh, 3 to 4 mm in size, flesh-colored, appears benign.  NEURO: Normal strength and tone, mentation intact and speech normal  PSYCH: mentation appears normal, affect normal/bright    Orders Placed This Encounter   Procedures     REVIEW OF HEALTH MAINTENANCE PROTOCOL ORDERS     MA Screen Bilateral w/Jeff     TSH WITH FREE T4 REFLEX     Lipid panel reflex to direct LDL Fasting        ASSESSMENT/PLAN:       ICD-10-CM    1. Routine physical examination  Z00.00       2. Subclinical hypothyroidism  E03.8 TSH WITH FREE T4 REFLEX      3. Paroxysmal supraventricular tachycardia (H)  I47.1 atenolol (TENORMIN) 25 MG tablet      4. Situational anxiety  F41.8 sertraline (ZOLOFT) 100 MG tablet      5. Cervical cancer screening  Z12.4 Pap screen with HPV - recommended age 30 - 65 years      6. CARDIOVASCULAR SCREENING; LDL GOAL LESS THAN 160  Z13.6 Lipid panel reflex to direct LDL Fasting      7. Encounter for screening mammogram for malignant  "neoplasm of breast  Z12.31 MA Screen Bilateral w/Jeff      8. Class 1 obesity due to excess calories without serious comorbidity with body mass index (BMI) of 32.0 to 32.9 in adult  E66.09     Z68.32           Patient has been advised of split billing requirements and indicates understanding: Yes  I recommend a yearly physical, monthly self breast exam, Pap smear every 3 years if normal or per ACOG guidelines.  We discussed early mammogram screening and I offered to start that for her now or she may do it anytime between now and 45 because of her family history.    I refilled her atenolol and sertraline.  We discussed thyroid.  I recommend checking a level today before we make any medication decisions.  Based on her level we can then discuss if a raise in dose is needed or change in medication.    COUNSELING:  Reviewed preventive health counseling, as reflected in patient instructions  Special attention given to:        Regular exercise       Healthy diet/nutrition       Vision screening       Immunizations    Up to date         Contraception discussed, prefers condoms, ok with pregnancy       Colorectal Cancer Screening due at 45       Consider Hep C screening for all patients one time for ages 18-79 years - declined       HIV screeninx in teen years, 1x in adult years, and at intervals if high risk - declined      BMI:   Estimated body mass index is 32.95 kg/m  as calculated from the following:    Height as of this encounter: 1.638 m (5' 4.5\").    Weight as of this encounter: 88.5 kg (195 lb).   Weight management plan: Discussed healthy diet and exercise guidelines      She reports that she has been smoking cigarettes. She has a 1.50 pack-year smoking history. She has never used smokeless tobacco.  Nicotine/Tobacco Cessation Plan:   Information offered: Patient not interested at this time          Gemini Arredondo MD  [facility]  "

## 2023-02-27 NOTE — RESULT ENCOUNTER NOTE
I left her a message to call back to discuss. We need to adjust her thyroid medication.   Gemini Arredondo MD

## 2023-02-28 ENCOUNTER — TELEPHONE (OUTPATIENT)
Dept: FAMILY MEDICINE | Facility: CLINIC | Age: 43
End: 2023-02-28
Payer: COMMERCIAL

## 2023-02-28 NOTE — TELEPHONE ENCOUNTER
----- Message from Gemini Arredondo MD sent at 2/27/2023  4:43 PM CST -----  I left her a message to call back to discuss. We need to adjust her thyroid medication.   Gemini Arredondo MD

## 2023-02-28 NOTE — TELEPHONE ENCOUNTER
Dr. Arredondo left message for patient to call back. If patient calls back please get Dr. Arredondo on phone she would like to discuss increasing patients meds.   Mirtha Slade MA       I left her a message to call back to discuss. We need to adjust her thyroid medication.   Gemini Arredondo MD

## 2023-03-01 LAB
BKR LAB AP GYN ADEQUACY: NORMAL
BKR LAB AP GYN INTERPRETATION: NORMAL
BKR LAB AP HPV REFLEX: NORMAL
BKR LAB AP LMP: NORMAL
BKR LAB AP PREVIOUS ABNORMAL: NORMAL
PATH REPORT.COMMENTS IMP SPEC: NORMAL
PATH REPORT.COMMENTS IMP SPEC: NORMAL
PATH REPORT.RELEVANT HX SPEC: NORMAL

## 2023-03-01 NOTE — TELEPHONE ENCOUNTER
Patient returned call. Please have Dr. Abrahamson call her back as there was a NW meeting at this time.    THOMPSON MontejoN, RN

## 2023-03-03 LAB
HUMAN PAPILLOMA VIRUS 16 DNA: NEGATIVE
HUMAN PAPILLOMA VIRUS 18 DNA: NEGATIVE
HUMAN PAPILLOMA VIRUS FINAL DIAGNOSIS: NORMAL
HUMAN PAPILLOMA VIRUS OTHER HR: NEGATIVE

## 2023-03-06 DIAGNOSIS — E03.8 SUBCLINICAL HYPOTHYROIDISM: ICD-10-CM

## 2023-03-06 RX ORDER — LEVOTHYROXINE SODIUM 75 UG/1
75 TABLET ORAL DAILY
Qty: 90 TABLET | Refills: 0 | Status: SHIPPED | OUTPATIENT
Start: 2023-03-06 | End: 2023-06-26

## 2023-03-07 NOTE — PROGRESS NOTES
I reviewed her lab results with her.  I am increasing her thyroid dose.  For the pills she has left she is going to alternate 1 and 2 on alternating days, then when she runs out we will bump her up to 75 mcg daily.  We will recheck labs in 2 months.  Gemini Arredondo MD

## 2023-03-22 NOTE — TELEPHONE ENCOUNTER
Already addressed per Dr. Arredondo. Closing this encounter. See lab result note to patient from 3/6/23.     Yovany Valente RN on 3/22/2023 at 3:44 PM

## 2023-05-11 NOTE — TELEPHONE ENCOUNTER
Levothyroxine      Last Written Prescription Date: 11/1/2016  Last Quantity: 90, # refills: 0  Last Office Visit with G, P or [facility] prescribing provider: 10/31/2016   Next 5 appointments (look out 90 days)     Mar 15, 2017 10:45 AM CDT   SHORT with Gemini Arredondo MD   [facility]    [address] [place] [id number] [phone]                   TSH   Date Value Ref Range Status   10/31/2016 4.52 (H) 0.40 - 4.00 mU/L Final       
Routing refill request to provider for review/approval because:  Labs out of range:  TSH.     Ana Olea RN         
She was due for follow up appt by now. I see she has appt in March, so will renew her rx until then.   Gemini Arredondo MD   
No

## 2023-06-15 ENCOUNTER — LAB (OUTPATIENT)
Dept: LAB | Facility: CLINIC | Age: 43
End: 2023-06-15
Payer: COMMERCIAL

## 2023-06-15 ENCOUNTER — TELEPHONE (OUTPATIENT)
Dept: FAMILY MEDICINE | Facility: CLINIC | Age: 43
End: 2023-06-15

## 2023-06-15 DIAGNOSIS — E03.8 SUBCLINICAL HYPOTHYROIDISM: ICD-10-CM

## 2023-06-15 DIAGNOSIS — E03.8 SUBCLINICAL HYPOTHYROIDISM: Primary | ICD-10-CM

## 2023-06-15 LAB — TSH SERPL DL<=0.005 MIU/L-ACNC: 1.56 UIU/ML (ref 0.3–4.2)

## 2023-06-15 PROCEDURE — 36415 COLL VENOUS BLD VENIPUNCTURE: CPT

## 2023-06-15 PROCEDURE — 84443 ASSAY THYROID STIM HORMONE: CPT

## 2023-06-15 NOTE — TELEPHONE ENCOUNTER
[facility] called and said patient is here today for her thyroid lab and they are unable to release order. I dug into it deeper and looks like the order that was placed on   on 2023.     Please sign new orders.   Mirtha Slade MA

## 2023-06-25 DIAGNOSIS — E03.8 SUBCLINICAL HYPOTHYROIDISM: ICD-10-CM

## 2023-06-26 RX ORDER — LEVOTHYROXINE SODIUM 75 UG/1
TABLET ORAL
Qty: 90 TABLET | Refills: 2 | Status: SHIPPED | OUTPATIENT
Start: 2023-06-26 | End: 2024-03-20

## 2024-03-02 DIAGNOSIS — I47.10 PAROXYSMAL SUPRAVENTRICULAR TACHYCARDIA (H): ICD-10-CM

## 2024-03-05 RX ORDER — ATENOLOL 25 MG/1
25 TABLET ORAL DAILY
Qty: 30 TABLET | Refills: 0 | Status: SHIPPED | OUTPATIENT
Start: 2024-03-05 | End: 2024-03-20

## 2024-03-06 DIAGNOSIS — F41.8 SITUATIONAL ANXIETY: ICD-10-CM

## 2024-03-06 RX ORDER — SERTRALINE HYDROCHLORIDE 100 MG/1
100 TABLET, FILM COATED ORAL DAILY
Qty: 30 TABLET | Refills: 0 | Status: SHIPPED | OUTPATIENT
Start: 2024-03-06 | End: 2024-03-20

## 2024-03-20 ENCOUNTER — OFFICE VISIT (OUTPATIENT)
Dept: FAMILY MEDICINE | Facility: CLINIC | Age: 44
End: 2024-03-20
Payer: COMMERCIAL

## 2024-03-20 VITALS
BODY MASS INDEX: 35.9 KG/M2 | OXYGEN SATURATION: 98 % | SYSTOLIC BLOOD PRESSURE: 122 MMHG | HEART RATE: 94 BPM | WEIGHT: 202.6 LBS | RESPIRATION RATE: 20 BRPM | DIASTOLIC BLOOD PRESSURE: 70 MMHG | HEIGHT: 63 IN | TEMPERATURE: 97.8 F

## 2024-03-20 DIAGNOSIS — R09.81 NASAL SINUS CONGESTION: ICD-10-CM

## 2024-03-20 DIAGNOSIS — Z12.31 VISIT FOR SCREENING MAMMOGRAM: ICD-10-CM

## 2024-03-20 DIAGNOSIS — F41.8 SITUATIONAL ANXIETY: ICD-10-CM

## 2024-03-20 DIAGNOSIS — E03.8 SUBCLINICAL HYPOTHYROIDISM: ICD-10-CM

## 2024-03-20 DIAGNOSIS — I47.10 PAROXYSMAL SUPRAVENTRICULAR TACHYCARDIA (H): ICD-10-CM

## 2024-03-20 DIAGNOSIS — R73.03 PREDIABETES: ICD-10-CM

## 2024-03-20 DIAGNOSIS — E66.1 CLASS 2 DRUG-INDUCED OBESITY WITH BODY MASS INDEX (BMI) OF 35.0 TO 35.9 IN ADULT, UNSPECIFIED WHETHER SERIOUS COMORBIDITY PRESENT: ICD-10-CM

## 2024-03-20 DIAGNOSIS — Z11.4 SCREENING FOR HIV (HUMAN IMMUNODEFICIENCY VIRUS): ICD-10-CM

## 2024-03-20 DIAGNOSIS — E66.812 CLASS 2 DRUG-INDUCED OBESITY WITH BODY MASS INDEX (BMI) OF 35.0 TO 35.9 IN ADULT, UNSPECIFIED WHETHER SERIOUS COMORBIDITY PRESENT: ICD-10-CM

## 2024-03-20 DIAGNOSIS — Z11.59 NEED FOR HEPATITIS C SCREENING TEST: ICD-10-CM

## 2024-03-20 DIAGNOSIS — Z00.00 ROUTINE GENERAL MEDICAL EXAMINATION AT A HEALTH CARE FACILITY: Primary | ICD-10-CM

## 2024-03-20 DIAGNOSIS — Z13.1 SCREENING FOR DIABETES MELLITUS: ICD-10-CM

## 2024-03-20 LAB
HBA1C MFR BLD: 5.7 %
TSH SERPL DL<=0.005 MIU/L-ACNC: 2.9 UIU/ML (ref 0.3–4.2)

## 2024-03-20 PROCEDURE — 84443 ASSAY THYROID STIM HORMONE: CPT | Performed by: STUDENT IN AN ORGANIZED HEALTH CARE EDUCATION/TRAINING PROGRAM

## 2024-03-20 PROCEDURE — 86803 HEPATITIS C AB TEST: CPT | Performed by: STUDENT IN AN ORGANIZED HEALTH CARE EDUCATION/TRAINING PROGRAM

## 2024-03-20 PROCEDURE — 99396 PREV VISIT EST AGE 40-64: CPT | Performed by: STUDENT IN AN ORGANIZED HEALTH CARE EDUCATION/TRAINING PROGRAM

## 2024-03-20 PROCEDURE — 36415 COLL VENOUS BLD VENIPUNCTURE: CPT | Performed by: STUDENT IN AN ORGANIZED HEALTH CARE EDUCATION/TRAINING PROGRAM

## 2024-03-20 PROCEDURE — 83036 HEMOGLOBIN GLYCOSYLATED A1C: CPT | Performed by: STUDENT IN AN ORGANIZED HEALTH CARE EDUCATION/TRAINING PROGRAM

## 2024-03-20 PROCEDURE — 87389 HIV-1 AG W/HIV-1&-2 AB AG IA: CPT | Performed by: STUDENT IN AN ORGANIZED HEALTH CARE EDUCATION/TRAINING PROGRAM

## 2024-03-20 RX ORDER — LEVOTHYROXINE SODIUM 75 UG/1
75 TABLET ORAL DAILY
Qty: 90 TABLET | Refills: 3 | Status: SHIPPED | OUTPATIENT
Start: 2024-03-20

## 2024-03-20 RX ORDER — SERTRALINE HYDROCHLORIDE 100 MG/1
100 TABLET, FILM COATED ORAL DAILY
Qty: 30 TABLET | Refills: 11 | Status: SHIPPED | OUTPATIENT
Start: 2024-03-20

## 2024-03-20 RX ORDER — FLUTICASONE PROPIONATE 50 MCG
SPRAY, SUSPENSION (ML) NASAL
Qty: 16 G | Refills: 2 | Status: SHIPPED | OUTPATIENT
Start: 2024-03-20

## 2024-03-20 RX ORDER — ATENOLOL 25 MG/1
25 TABLET ORAL DAILY
Qty: 30 TABLET | Refills: 11 | Status: SHIPPED | OUTPATIENT
Start: 2024-03-20

## 2024-03-20 SDOH — HEALTH STABILITY: PHYSICAL HEALTH: ON AVERAGE, HOW MANY DAYS PER WEEK DO YOU ENGAGE IN MODERATE TO STRENUOUS EXERCISE (LIKE A BRISK WALK)?: 2 DAYS

## 2024-03-20 ASSESSMENT — SOCIAL DETERMINANTS OF HEALTH (SDOH): HOW OFTEN DO YOU GET TOGETHER WITH FRIENDS OR RELATIVES?: THREE TIMES A WEEK

## 2024-03-20 ASSESSMENT — PAIN SCALES - GENERAL: PAINLEVEL: MODERATE PAIN (4)

## 2024-03-20 NOTE — PATIENT INSTRUCTIONS
Preventive Care Advice   This is general advice given by our system to help you stay healthy. However, your care team may have specific advice just for you. Please talk to your care team about your preventive care needs.  Nutrition  Eat 5 or more servings of fruits and vegetables each day.  Try wheat bread, brown rice and whole grain pasta (instead of white bread, rice, and pasta).  Get enough calcium and vitamin D. Check the label on foods and aim for 100% of the RDA (recommended daily allowance).  Lifestyle  Exercise at least 150 minutes each week   (30 minutes a day, 5 days a week).  Do muscle strengthening activities 2 days a week. These help control your weight and prevent disease.  No smoking.  Wear sunscreen to prevent skin cancer.  Have a dental exam and cleaning every 6 months.  Yearly exams  See your health care team every year to talk about:  Any changes in your health.  Any medicines your care team has prescribed.  Preventive care, family planning, and ways to prevent chronic diseases.  Shots (vaccines)   HPV shots (up to age 26), if you've never had them before.  Hepatitis B shots (up to age 59), if you've never had them before.  COVID-19 shot: Get this shot when it's due.  Flu shot: Get a flu shot every year.  Tetanus shot: Get a tetanus shot every 10 years.  Pneumococcal, hepatitis A, and RSV shots: Ask your care team if you need these based on your risk.  Shingles shot (for age 50 and up).  General health tests  Diabetes screening:  Starting at age 35, Get screened for diabetes at least every 3 years.  If you are younger than age 35, ask your care team if you should be screened for diabetes.  Cholesterol test: At age 39, start having a cholesterol test every 5 years, or more often if advised.  Bone density scan (DEXA): At age 50, ask your care team if you should have this scan for osteoporosis (brittle bones).  Hepatitis C: Get tested at least once in your life.  STIs (sexually transmitted  infections)  Before age 24: Ask your care team if you should be screened for STIs.  After age 24: Get screened for STIs if you're at risk. You are at risk for STIs (including HIV) if:  You are sexually active with more than one person.  You don't use condoms every time.  You or a partner was diagnosed with a sexually transmitted infection.  If you are at risk for HIV, ask about PrEP medicine to prevent HIV.  Get tested for HIV at least once in your life, whether you are at risk for HIV or not.  Cancer screening tests  Cervical cancer screening: If you have a cervix, begin getting regular cervical cancer screening tests at age 21. Most people who have regular screenings with normal results can stop after age 65. Talk about this with your provider.  Breast cancer scan (mammogram): If you've ever had breasts, begin having regular mammograms starting at age 40. This is a scan to check for breast cancer.  Colon cancer screening: It is important to start screening for colon cancer at age 45.  Have a colonoscopy test every 10 years (or more often if you're at risk) Or, ask your provider about stool tests like a FIT test every year or Cologuard test every 3 years.  To learn more about your testing options, visit: [email or web address].  For help making a decision, visit: https://bit.ly/ds63078.  Prostate cancer screening test: If you have a prostate and are age 55 to 69, ask your provider if you would benefit from a yearly prostate cancer screening test.  Lung cancer screening: If you are a current or former smoker age 50 to 80, ask your care team if ongoing lung cancer screenings are right for you.  For informational purposes only. Not to replace the advice of your health care provider. Copyright   2023 [employer]. All rights reserved. Clinically reviewed by the [facility]. Team Apart [id number] - REV 01/24.    Eating Healthy Foods: Care Instructions  With every meal, you  "can make healthy food choices. Try to eat a variety of fruits, vegetables, whole grains, lean proteins, and low-fat dairy products. This can help you get the right balance of nutrients, including vitamins and minerals. Small changes add up over time. You can start by adding one healthy food to your meals each day.    Try to make half your plate fruits and vegetables, one-fourth whole grains, and one-fourth lean proteins. Try including dairy with your meals.   Eat more fruits and vegetables. Try to have them with most meals and snacks.   Foods for healthy eating    Fruits    These can be fresh, frozen, canned, or dried.  Try to choose whole fruit rather than fruit juice.  Eat a variety of colors.    Vegetables    These can be fresh, frozen, canned, or dried.  Beans, peas, and lentils count too.    Whole grains    Choose whole-grain breads, cereals, and noodles.  Try brown rice.    Lean proteins    These can include lean meat, poultry, fish, and eggs.  You can also have tofu, beans, peas, lentils, nuts, and seeds.    Dairy    Try milk, yogurt, and cheese.  Choose low-fat or fat-free when you can.  If you need to, use lactose-free milk or fortified plant-based milk products, such as soy milk.    Water    Drink water when you're thirsty.  Limit sugar-sweetened drinks, including soda, fruit drinks, and sports drinks.  Where can you learn more?  Go to https://www.[employer].net/patiented  Enter T756 in the search box to learn more about \"Eating Healthy Foods: Care Instructions.\"  Current as of: September 20, 2023               Content Version: 14.0    9808-4078 [employer].   Care instructions adapted under license by your healthcare professional. If you have questions about a medical condition or this instruction, always ask your healthcare professional. [employer] disclaims any warranty or liability for your use of this information.      Learning About Stress  What is stress?     Stress is " your body's response to a hard situation. Your body can have a physical, emotional, or mental response. Stress is a fact of life for most people, and it affects everyone differently. What causes stress for you may not be stressful for someone else.  A lot of things can cause stress. You may feel stress when you go on a job interview, take a test, or run a race. This kind of short-term stress is normal and even useful. It can help you if you need to work hard or react quickly. For example, stress can help you finish an important job on time.  Long-term stress is caused by ongoing stressful situations or events. Examples of long-term stress include long-term health problems, ongoing problems at work, or conflicts in your family. Long-term stress can harm your health.  How does stress affect your health?  When you are stressed, your body responds as though you are in danger. It makes hormones that speed up your heart, make you breathe faster, and give you a burst of energy. This is called the fight-or-flight stress response. If the stress is over quickly, your body goes back to normal and no harm is done.  But if stress happens too often or lasts too long, it can have bad effects. Long-term stress can make you more likely to get sick, and it can make symptoms of some diseases worse. If you tense up when you are stressed, you may develop neck, shoulder, or low back pain. Stress is linked to high blood pressure and heart disease.  Stress also harms your emotional health. It can make you macario, tense, or depressed. Your relationships may suffer, and you may not do well at work or school.  What can you do to manage stress?  You can try these things to help manage stress:   Do something active. Exercise or activity can help reduce stress. Walking is a great way to get started. Even everyday activities such as housecleaning or yard work can help.  Try yoga or doug chi. These techniques combine exercise and meditation. You may  need some training at first to learn them.  Do something you enjoy. For example, listen to music or go to a movie. Practice your hobby or do volunteer work.  Meditate. This can help you relax, because you are not worrying about what happened before or what may happen in the future.  Do guided imagery. Imagine yourself in any setting that helps you feel calm. You can use online videos, books, or a teacher to guide you.  Do breathing exercises. For example:  From a standing position, bend forward from the waist with your knees slightly bent. Let your arms dangle close to the floor.  Breathe in slowly and deeply as you return to a standing position. Roll up slowly and lift your head last.  Hold your breath for just a few seconds in the standing position.  Breathe out slowly and bend forward from the waist.  Let your feelings out. Talk, laugh, cry, and express anger when you need to. Talking with supportive friends or family, a counselor, or a nic leader about your feelings is a healthy way to relieve stress. Avoid discussing your feelings with people who make you feel worse.  Write. It may help to write about things that are bothering you. This helps you find out how much stress you feel and what is causing it. When you know this, you can find better ways to cope.  What can you do to prevent stress?  You might try some of these things to help prevent stress:  Manage your time. This helps you find time to do the things you want and need to do.  Get enough sleep. Your body recovers from the stresses of the day while you are sleeping.  Get support. Your family, friends, and community can make a difference in how you experience stress.  Limit your news feed. Avoid or limit time on social media or news that may make you feel stressed.  Do something active. Exercise or activity can help reduce stress. Walking is a great way to get started.  Where can you learn more?  Go to https://www.healthwise.net/patiented  Enter N032  "in the search box to learn more about \"Learning About Stress.\"  Current as of: October 24, 2023               Content Version: 14.0    9518-0869 [employer].   Care instructions adapted under license by your healthcare professional. If you have questions about a medical condition or this instruction, always ask your healthcare professional. [employer] disclaims any warranty or liability for your use of this information.      Substance Use Disorder: Care Instructions  Overview     You can improve your life and health by stopping your use of alcohol or drugs. When you don't drink or use drugs, you may feel and sleep better. You may get along better with your family, friends, and coworkers. There are medicines and programs that can help with substance use disorder.  How can you care for yourself at home?  Here are some ways to help you stay sober and prevent relapse.  If you have been given medicine to help keep you sober or reduce your cravings, be sure to take it exactly as prescribed.  Talk to your doctor about programs that can help you stop using drugs or drinking alcohol.  Do not keep alcohol or drugs in your home.  Plan ahead. Think about what you'll say if other people ask you to drink or use drugs. Try not to spend time with people who drink or use drugs.  Use the time and money spent on drinking or drugs to do something that's important to you.  Preventing a relapse  Have a plan to deal with relapse. Learn to recognize changes in your thinking that lead you to drink or use drugs. Get help before you start to drink or use drugs again.  Try to stay away from situations, friends, or places that may lead you to drink or use drugs.  If you feel the need to drink alcohol or use drugs again, seek help right away. Call a trusted friend or family member. Some people get support from organizations such as Narcotics Anonymous or [place] or from treatment facilities.  If you relapse, get " help as soon as you can. Some people make a plan with another person that outlines what they want that person to do for them if they relapse. The plan usually includes how to handle the relapse and who to notify in case of relapse.  Don't give up. Remember that a relapse doesn't mean that you have failed. Use the experience to learn the triggers that lead you to drink or use drugs. Then quit again. Recovery is a lifelong process. Many people have several relapses before they are able to quit for good.  Follow-up care is a key part of your treatment and safety. Be sure to make and go to all appointments, and call your doctor if you are having problems. It's also a good idea to know your test results and keep a list of the medicines you take.  When should you call for help?   Call 911  anytime you think you may need emergency care. For example, call if you or someone else:    Has overdosed or has withdrawal signs. Be sure to tell the emergency workers that you are or someone else is using or trying to quit using drugs. Overdose or withdrawal signs may include:  Losing consciousness.  Seizure.  Seeing or hearing things that aren't there (hallucinations).     Is thinking or talking about suicide or harming others.   Where to get help 24 hours a day, 7 days a week   If you or someone you know talks about suicide, self-harm, a mental health crisis, a substance use crisis, or any other kind of emotional distress, get help right away. You can:    Call the Suicide and Crisis Lifeline at 983.     Call 8-724-607-GESG (1-857.885.7714).     Text HOME to 999244 to access the Crisis Text Line.   Consider saving these numbers in your phone.  Go to Financetesetudesline.org for more information or to chat online.  Call your doctor now or seek immediate medical care if:    You are having withdrawal symptoms. These may include nausea or vomiting, sweating, shakiness, and anxiety.   Watch closely for changes in your health, and be sure to  "contact your doctor if:    You have a relapse.     You need more help or support to stop.   Where can you learn more?  Go to https://www.[employer].net/patiented  Enter H573 in the search box to learn more about \"Substance Use Disorder: Care Instructions.\"  Current as of: November 15, 2023               Content Version: 14.0    2884-3194 [employer].   Care instructions adapted under license by your healthcare professional. If you have questions about a medical condition or this instruction, always ask your healthcare professional. [employer] disclaims any warranty or liability for your use of this information.      "

## 2024-03-20 NOTE — PROGRESS NOTES
"Preventive Care Visit  [facility]  Martha Chaudhry DO, Family Medicine  Mar 20, 2024      Assessment & Plan     Routine general medical examination at a health care facility  Recommend annual wellness visits, screens, and immunizations as indicated.      Subclinical hypothyroidism  Stable TSH, refills of levothyroxine sent through next annual.  - TSH with free T4 reflex; Future  - TSH with free T4 reflex  - levothyroxine (SYNTHROID/LEVOTHROID) 75 MCG tablet; Take 1 tablet (75 mcg) by mouth daily    Prediabetes  Screening for diabetes mellitus  A1c borderline prediabetes at 5.7. Highly recommend emphasis on dietary modification to reduce simple and processed sugars and increased exercise. May be able to reverse while also benefiting with weight loss. Consider recheck in 6 months or next annual.  - Hemoglobin A1c; Future  - Hemoglobin A1c    Paroxysmal supraventricular tachycardia  Stable. Cont atenolol. Refills sent through next annual.  - atenolol (TENORMIN) 25 MG tablet; Take 1 tablet (25 mg) by mouth daily    Situational anxiety  Stable. Cont Zoloft. Refills sent through next annual.  - sertraline (ZOLOFT) 100 MG tablet; Take 1 tablet (100 mg) by mouth daily    Nasal sinus congestion  Cont flonase. Refills sent.  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    Weight as of this encounter: 91.9 kg (202 lb 9.6 oz).   Weight management plan: Discussed healthy diet and exercise guidelines , previously discussed WW with PCP, this did not help, inquired regarding Plenity, which may be a viable option. Offered to research best route for prescribing with " [facility] pharmacy, will follow-up with patient once more info known. In meantime advised to focus on dietary modification and increasing exercise.    Visit for screening mammogram  Ordered. Patient did not go for last years screen, states will return for this one.  - MA SCREENING DIGITAL BILAT - Future  (s+30); Future    Screening for HIV (human immunodeficiency virus)  Screen.  - HIV Antigen Antibody Combo; Future  - HIV Antigen Antibody Combo    Need for hepatitis C screening test  Screen.  - Hepatitis C Screen Reflex to HCV RNA Quant and Genotype; Future  - Hepatitis C Screen Reflex to HCV RNA Quant and Genotype    Nicotine/Tobacco Cessation  She reports that she has been smoking cigarettes. She has a 1.5 pack-year smoking history. She has never used smokeless tobacco.  Nicotine/Tobacco Cessation Plan  Information offered: Patient not interested at this time    Counseling  Appropriate preventive services were discussed with this patient, including applicable screening as appropriate for fall prevention, nutrition, physical activity, Tobacco-use cessation, weight loss and cognition.  Checklist reviewing preventive services available has been given to the patient.  Reviewed patient's diet, addressing concerns and/or questions.   She is at risk for lack of exercise and has been provided with information to increase physical activity for the benefit of her well-being.   She is at risk for psychosocial distress and has been provided with information to reduce risk.     Marlen Zelaya is a 43 year old, presenting for the following:  Physical        3/20/2024     4:49 PM   Additional Questions   Roomed by Mirtha HERNANDEZ        Health Care Directive  Patient does not have a Health Care Directive or Living Will: Discussed advance care planning with patient; however, patient declined at this time.    HPI  No major concerns. Going well.   Hx of R thumb pain, used wrist brace, saw chiro, got better. Now having with L hand.  Knee  issues similar to hands. Wondered if could be arthritis.       3/20/2024   General Health   How would you rate your overall physical health? Good   Feel stress (tense, anxious, or unable to sleep) Only a little   (!) STRESS CONCERN  Not concerned.      3/20/2024   Nutrition   Three or more servings of calcium each day? Yes   Diet: Regular (no restrictions)   How many servings of fruit and vegetables per day? (!) 0-1    How many sweetened beverages each day? 0-1   Try to increase fruits and veggies.        3/20/2024   Exercise   Days per week of moderate/strenous exercise 2 days   (!) EXERCISE CONCERN  Rec increase exercise. Standard recs of 150 minutes mod intensity weekly.      3/20/2024   Social Factors   Frequency of gathering with friends or relatives Three times a week   Worry food won't last until get money to buy more No   Food not last or not have enough money for food? No   Do you have housing?  Yes   Are you worried about losing your housing? No   Lack of transportation? No   Unable to get utilities (heat,electricity)? No         3/20/2024   Dental   Dentist two times every year? Yes         3/20/2024   TB Screening   Were you born outside of the [place]? No     Today's PHQ-2 Score:       3/20/2024     4:49 PM   PHQ-2 ( 1999 [employer])   Q1: Little interest or pleasure in doing things 0   Q2: Feeling down, depressed or hopeless 0   PHQ-2 Score 0   Q1: Little interest or pleasure in doing things Not at all   Q2: Feeling down, depressed or hopeless Not at all   PHQ-2 Score 0         3/20/2024   Substance Use   Alcohol more than 3/day or more than 7/wk No   Do you use any other substances recreationally? (!) CANNABIS PRODUCTS   Gummies, or drinks.     Social History     Tobacco Use    Smoking status: Every Day     Packs/day: 0.50     Years: 3.00     Additional pack years: 0.00     Total pack years: 1.50     Types: Cigarettes    Smokeless tobacco: Never   Vaping Use    Vaping Use: Never used   Substance Use Topics     Alcohol use: Yes     Comment: occasionally    Drug use: No         2/27/2023   LAST FHS-7 RESULTS   1st degree relative breast or ovarian cancer Yes   Any relative bilateral breast cancer Unknown   Any male have breast cancer No   Any ONE woman have BOTH breast AND ovarian cancer Yes   Any woman with breast cancer before 50yrs No   2 or more relatives with breast AND/OR ovarian cancer Yes   2 or more relatives with breast AND/OR bowel cancer Yes        Mammogram Screening - Mammogram every 1-2 years updated in Health Maintenance based on mutual decision making        3/20/2024   One time HIV Screening   Previous HIV test? I don't know         3/20/2024   STI Screening   New sexual partner(s) since last STI/HIV test? No     History of abnormal Pap smear: NO - age 30-65 PAP every 5 years with negative HPV co-testing recommended        Latest Ref Rng & Units 2/27/2023     8:21 AM 9/13/2019     9:44 AM 10/31/2016     2:20 PM   PAP / HPV   PAP  Negative for Intraepithelial Lesion or Malignancy (NILM)      PAP (Historical)   NIL     HPV 16 DNA Negative Negative  Negative  Negative    HPV 18 DNA Negative Negative  Negative  Negative    Other HR HPV Negative Negative  Negative  Negative      ASCVD Risk   The 10-year ASCVD risk score (Angella REYNOSO, et al., 2019) is: 3.2%    Values used to calculate the score:      Age: 43 years      Sex: Female      Is Non- : No      Diabetic: No      Tobacco smoker: Yes      Systolic Blood Pressure: 122 mmHg      Is BP treated: No      HDL Cholesterol: 45 mg/dL      Total Cholesterol: 198 mg/dL        3/20/2024   Contraception/Family Planning   Questions about contraception or family planning No        Reviewed and updated as needed this visit by Provider   Tobacco  Allergies  Meds  Problems  Med Hx  Surg Hx  Fam Hx            Past Medical History:   Diagnosis Date    NO ACTIVE PROBLEMS      Past Surgical History:   Procedure Laterality Date    NO  HISTORY OF SURGERY       OB History    Para Term  AB Living   0 0 0 0 0 0   SAB IAB Ectopic Multiple Live Births   0 0 0 0 0     Labs reviewed in EPIC  BP Readings from Last 3 Encounters:   24 122/70   23 104/72   21 110/80    Wt Readings from Last 3 Encounters:   24 91.9 kg (202 lb 9.6 oz)   23 88.5 kg (195 lb)   21 86.8 kg (191 lb 4 oz)         Patient Active Problem List   Diagnosis    Tobacco use disorder    Headache    CARDIOVASCULAR SCREENING; LDL GOAL LESS THAN 160    Subclinical hypothyroidism    Encounter for surveillance of contraceptive pills    Nasal sinus congestion    Situational anxiety    Paroxysmal supraventricular tachycardia    Class 1 obesity due to excess calories without serious comorbidity with body mass index (BMI) of 32.0 to 32.9 in adult     Past Surgical History:   Procedure Laterality Date    NO HISTORY OF SURGERY         Social History     Tobacco Use    Smoking status: Every Day     Packs/day: 0.50     Years: 3.00     Additional pack years: 0.00     Total pack years: 1.50     Types: Cigarettes    Smokeless tobacco: Never   Substance Use Topics    Alcohol use: Yes     Comment: occasionally     Family History   Problem Relation Age of Onset    Breast Cancer Mother 53    Hypertension Father     Prostate Cancer Father 50        negative testing for cancer genes    Cancer Father         kidney, metastatic to bone.     Hypertension Sister     Asthma Brother     Cancer Maternal Grandmother         lung    Cancer Maternal Grandfather         lung and bone    Cancer Paternal Grandfather         stomach    Cancer Maternal Uncle 50        rectal    Cancer Paternal Aunt 56        cervical    Cancer Paternal Aunt 56        ovarian         Current Outpatient Medications   Medication Sig Dispense Refill    atenolol (TENORMIN) 25 MG tablet Take 1 tablet (25 mg) by mouth daily 30 tablet 11    fluticasone (FLONASE) 50 MCG/ACT nasal spray USE 2 SPRAY(S) IN  "EACH NOSTRIL ONCE DAILY AS NEEDED FOR  RHINITIS  OR  ALLERGIES 16 g 2    levothyroxine (SYNTHROID/LEVOTHROID) 75 MCG tablet Take 1 tablet by mouth once daily 90 tablet 2    sertraline (ZOLOFT) 100 MG tablet Take 1 tablet (100 mg) by mouth daily 30 tablet 11     No Known Allergies  Recent Labs   Lab Test 03/20/24  1744 06/15/23  0909 02/27/23  0845 02/27/23  0845 09/24/21  1008 07/22/19  1742 07/20/18  0942 03/15/17  1120 10/31/16  1443   A1C 5.7*  --   --   --   --   --   --   --   --    LDL  --   --   --  123*  --   --  99  --   --    HDL  --   --   --  45*  --   --  56  --   --    TRIG  --   --   --  150*  --   --  117  --   --    ALT  --   --   --   --  26  --   --   --   --    CR  --   --   --   --  0.85  --  0.84  --  0.79   GFRESTIMATED  --   --   --   --  87  --  77  --  83   GFRESTBLACK  --   --   --   --   --   --  >90  --  >90  African American GFR Calc     POTASSIUM  --   --   --   --   --   --  4.2  --  4.3   TSH 2.90 1.56   < > 3.39 2.42   < > 1.56   < > 4.52*    < > = values in this interval not displayed.          Review of Systems  Negative unless otherwise specified.       Objective    Exam  /70   Pulse 94   Temp 97.8  F (36.6  C) (Temporal)   Resp 20   Ht 1.61 m (5' 3.39\")   Wt 91.9 kg (202 lb 9.6 oz)   LMP 03/04/2024   SpO2 98%   BMI 35.45 kg/m     Estimated body mass index is 35.45 kg/m  as calculated from the following:    Height as of this encounter: 1.61 m (5' 3.39\").    Weight as of this encounter: 91.9 kg (202 lb 9.6 oz).    Physical Exam  GENERAL: alert and no distress  EYES: Eyes grossly normal to inspection, PERRL and conjunctivae and sclerae normal  HENT: nose and mouth without ulcers or lesions  NECK: no adenopathy, no asymmetry, masses, or scars  RESP: lungs clear to auscultation - no rales, rhonchi or wheezes, normal rate and effort  CV: regular rate and rhythm, no peripheral edema  ABDOMEN: soft, nontender, non distended, no hepatosplenomegaly, no masses and BSNx4  MS: " no gross musculoskeletal defects noted, no edema, + finkelstein's on LUE  SKIN: no suspicious lesions or rashes  NEURO: Normal strength and tone, mentation intact and speech normal  PSYCH: mentation appears normal, affect normal/bright    Signed Electronically by: Martha Chaudhry DO

## 2024-03-21 ENCOUNTER — TELEPHONE (OUTPATIENT)
Dept: FAMILY MEDICINE | Facility: CLINIC | Age: 44
End: 2024-03-21
Payer: COMMERCIAL

## 2024-03-21 LAB
HCV AB SERPL QL IA: NONREACTIVE
HIV 1+2 AB+HIV1 P24 AG SERPL QL IA: NONREACTIVE

## 2024-03-21 NOTE — TELEPHONE ENCOUNTER
----- Message from Martha Chaudhry DO sent at 3/20/2024  6:28 PM CDT -----  Please advise patient:    TSH is wnl. Will send refills to complete 1 year at current dosing as typical for PCP.     Borderline prediabetes at 5.7 for A1c, this is the lowest number that constitutes prediabetes. Recommend watching carb intake, especially simple and processed sugars and working on exercise. Pursuing weight loss can also be helpful, which I know you plan to do, as well.

## 2024-03-21 NOTE — TELEPHONE ENCOUNTER
Spoke with patient and informed of results below. Patient understood.     Closing encounter.   Mirtha Slade MA

## 2024-04-16 ENCOUNTER — TELEPHONE (OUTPATIENT)
Dept: FAMILY MEDICINE | Facility: CLINIC | Age: 44
End: 2024-04-16
Payer: COMMERCIAL

## 2024-04-16 DIAGNOSIS — Z76.89 ENCOUNTER FOR WEIGHT MANAGEMENT: ICD-10-CM

## 2024-04-16 DIAGNOSIS — E66.9 OBESITY WITH BODY MASS INDEX (BMI) OF 35.0 TO 39.9 WITHOUT COMORBIDITY: Primary | ICD-10-CM

## 2024-04-25 ENCOUNTER — TELEPHONE (OUTPATIENT)
Dept: FAMILY MEDICINE | Facility: CLINIC | Age: 44
End: 2024-04-25
Payer: COMMERCIAL

## 2024-04-25 DIAGNOSIS — E66.9 OBESITY WITH BODY MASS INDEX (BMI) OF 35.0 TO 39.9 WITHOUT COMORBIDITY: ICD-10-CM

## 2024-04-25 DIAGNOSIS — Z76.89 ENCOUNTER FOR WEIGHT MANAGEMENT: ICD-10-CM

## 2024-04-25 RX ORDER — CARBOXYMETHYLCELLULOSE/CITRIC 0.75 G
3 CAPSULE ORAL 2 TIMES DAILY
Qty: 168 CAPSULE | Refills: 5 | Status: SHIPPED | OUTPATIENT
Start: 2024-04-25 | End: 2024-04-26

## 2024-04-25 NOTE — TELEPHONE ENCOUNTER
"Item can not be obtained from local pharmacies. The only option is through the mail order pharmacy indicated on their website.   After I submit the script, it indicates the mail order pharmacy \"should\" reach out to her to coordinate payment and mailing of the plenity.  "
Left message on answering machine for patient to call back.      Brooklynn Rich MA    
Patient had annual physical on 3/20/24.   During visit she had discussed Plenity (dietary supplement) with provider.   Patient reports provider was going to consult with [facility] pharmacy and follow up with patient.   She still has not heard back from provider and would like an update on this.     Naida MACKAYN, RN   
Patient is still interested in this and would like to have you order.    She would like sent to [facility]/      
None

## 2024-04-25 NOTE — TELEPHONE ENCOUNTER
Called and left message to call back.  When she does please relay the message below from dr.Olsen Brionna Rodney MA

## 2024-04-25 NOTE — TELEPHONE ENCOUNTER
"Item can not be obtained from local pharmacies. The only option is through the mail order pharmacy indicated on their website.   After I submit the script, it indicates the mail order pharmacy \"should\" reach out to her to coordinate payment and mailing of the plenity.     "

## 2024-04-26 RX ORDER — CARBOXYMETHYLCELLULOSE/CITRIC 0.75 G
3 CAPSULE ORAL 2 TIMES DAILY
Qty: 168 CAPSULE | Refills: 5 | Status: SHIPPED | OUTPATIENT
Start: 2024-04-26

## 2024-04-26 NOTE — TELEPHONE ENCOUNTER
RN reviewed patient chart. Cellulose-Citric Acid (PLENITY WELCOME KIT) CAPS was sent to [facility] in [place].     RN called [facility] they do not have record of the prescription.     RN reviewed notes below. Provider indicated in note below from telephone encounter 4/16/24 that prescription was sent to mail order pharmacy on [employer] website ([facility])    RN reviewed med list and historical med list and can not find that Cellulose-Citric Acid (PLENITY WELCOME KIT) CAPS were sent to [facility].     RN resending medication to [facility].     RN placed call to patient and advised that medication sent to [facility]. Gave patient contact number if she does not hear from pharmacy.   [phone]    Patient verbalized understanding and all questions answered.     ANDREEA Lea, RN  [facility] Registered Nurse  Clinic Triage  April 26, 2024

## 2024-05-03 NOTE — TELEPHONE ENCOUNTER
Medication Question or Refill    Contacts         Type Contact Phone/Fax    04/26/2024 09:43 AM CDT Phone (Outgoing) [facility] [address] [place], [place] - [address] (Pharmacy) [phone]    04/26/2024 10:28 AM CDT Phone (Outgoing) Nathalie Younger (Self) [phone] (H)    05/03/2024 02:35 PM CDT Phone (Incoming) Nathalie Younger (Self) [phone] (H)            What medication are you calling about (include dose and sig)?: Cellulose-Citric Acid (PLENITY WELCOME KIT) CAPS . This was discontinued last October. Patient requesting an alterative medication.    Preferred Pharmacy:       [facility] [address]  [address] [place], [place] [address]Destiny Pike  [address] [address]  Phone: [phone] Fax: [phone]      Controlled Substance Agreement on file:   CSA -- Patient Level:    CSA: None found at the patient level.       Who prescribed the medication?: Dr. Chaudhry    Do you need a refill? No medication discounted last October.     When did you use the medication last?     Patient offered an appointment? No    Do you have any questions or concerns?  Yes: would like and alterative medication      Okay to leave a detailed message?: Yes at Home number on file [phone] (home)

## 2024-05-07 NOTE — TELEPHONE ENCOUNTER
Martha Chaudhry, DO  [facility] Pool4 days ago     RO  If I am reading the note correctly and the medication was discontinued, I am unaware of any alternative. The patient specifically desired the cellulose option and Plenity was the only one I am familiar with.      Patient plans of care is placement of  Rehab. No further assessment needed for oxygen unless patient condition changes or returns home .

## 2024-09-19 ENCOUNTER — HOSPITAL ENCOUNTER (OUTPATIENT)
Dept: MAMMOGRAPHY | Facility: CLINIC | Age: 44
Discharge: HOME | End: 2024-09-19
Attending: STUDENT IN AN ORGANIZED HEALTH CARE EDUCATION/TRAINING PROGRAM | Admitting: STUDENT IN AN ORGANIZED HEALTH CARE EDUCATION/TRAINING PROGRAM
Payer: COMMERCIAL

## 2024-09-19 DIAGNOSIS — Z12.31 VISIT FOR SCREENING MAMMOGRAM: ICD-10-CM

## 2024-09-19 PROCEDURE — 77063 BREAST TOMOSYNTHESIS BI: CPT

## 2025-01-29 ENCOUNTER — TELEPHONE (OUTPATIENT)
Dept: FAMILY MEDICINE | Facility: CLINIC | Age: 45
End: 2025-01-29
Payer: COMMERCIAL

## 2025-02-03 NOTE — TELEPHONE ENCOUNTER
Doned by MAMADOU Chaudhry,  1/29/2025.  Sara Mock RN    
Patient is calling and stated from another clinic, she will be starting on GPL-1 injections for weight loss.      Patient is asking what the diagnosis was for her prescription, atenolol, as she is suppose to let the other clinic know prior to starting the GPL- 1 injections.    Patient is requesting FYI be sent to PCP.    Sara Mock RN    
No muscle or joint tenderness

## 2025-03-20 DIAGNOSIS — I47.10 PAROXYSMAL SUPRAVENTRICULAR TACHYCARDIA: ICD-10-CM

## 2025-03-20 DIAGNOSIS — F41.8 SITUATIONAL ANXIETY: ICD-10-CM

## 2025-03-24 RX ORDER — ATENOLOL 25 MG/1
25 TABLET ORAL DAILY
Qty: 30 TABLET | Refills: 0 | Status: SHIPPED | OUTPATIENT
Start: 2025-03-24

## 2025-03-24 RX ORDER — SERTRALINE HYDROCHLORIDE 100 MG/1
100 TABLET, FILM COATED ORAL DAILY
Qty: 30 TABLET | Refills: 0 | Status: SHIPPED | OUTPATIENT
Start: 2025-03-24

## 2025-04-09 ENCOUNTER — OFFICE VISIT (OUTPATIENT)
Dept: FAMILY MEDICINE | Facility: CLINIC | Age: 45
End: 2025-04-09
Payer: COMMERCIAL

## 2025-04-09 VITALS
SYSTOLIC BLOOD PRESSURE: 112 MMHG | BODY MASS INDEX: 32.61 KG/M2 | RESPIRATION RATE: 16 BRPM | DIASTOLIC BLOOD PRESSURE: 74 MMHG | HEIGHT: 64 IN | TEMPERATURE: 97.7 F | OXYGEN SATURATION: 96 % | HEART RATE: 95 BPM | WEIGHT: 191 LBS

## 2025-04-09 DIAGNOSIS — G43.829 MENSTRUAL MIGRAINE WITHOUT STATUS MIGRAINOSUS, NOT INTRACTABLE: ICD-10-CM

## 2025-04-09 DIAGNOSIS — Z28.21 PNEUMOCOCCAL VACCINATION DECLINED: ICD-10-CM

## 2025-04-09 DIAGNOSIS — Z13.220 LIPID SCREENING: ICD-10-CM

## 2025-04-09 DIAGNOSIS — R09.81 NASAL SINUS CONGESTION: ICD-10-CM

## 2025-04-09 DIAGNOSIS — F41.8 SITUATIONAL ANXIETY: ICD-10-CM

## 2025-04-09 DIAGNOSIS — E66.811 CLASS 1 OBESITY DUE TO EXCESS CALORIES WITH SERIOUS COMORBIDITY AND BODY MASS INDEX (BMI) OF 32.0 TO 32.9 IN ADULT: ICD-10-CM

## 2025-04-09 DIAGNOSIS — Z00.00 ROUTINE GENERAL MEDICAL EXAMINATION AT A HEALTH CARE FACILITY: Primary | ICD-10-CM

## 2025-04-09 DIAGNOSIS — R73.03 PREDIABETES: ICD-10-CM

## 2025-04-09 DIAGNOSIS — E03.8 SUBCLINICAL HYPOTHYROIDISM: ICD-10-CM

## 2025-04-09 DIAGNOSIS — J34.89 SORE IN NOSE: ICD-10-CM

## 2025-04-09 DIAGNOSIS — F17.200 TOBACCO USE DISORDER: ICD-10-CM

## 2025-04-09 DIAGNOSIS — I47.10 PAROXYSMAL SUPRAVENTRICULAR TACHYCARDIA: ICD-10-CM

## 2025-04-09 DIAGNOSIS — E66.09 CLASS 1 OBESITY DUE TO EXCESS CALORIES WITH SERIOUS COMORBIDITY AND BODY MASS INDEX (BMI) OF 32.0 TO 32.9 IN ADULT: ICD-10-CM

## 2025-04-09 DIAGNOSIS — E78.5 HYPERLIPIDEMIA LDL GOAL <160: ICD-10-CM

## 2025-04-09 DIAGNOSIS — Z28.21 HEPATITIS B VACCINATION DECLINED: ICD-10-CM

## 2025-04-09 LAB
CHOLEST SERPL-MCNC: 198 MG/DL
EST. AVERAGE GLUCOSE BLD GHB EST-MCNC: 111 MG/DL
FASTING STATUS PATIENT QL REPORTED: YES
HBA1C MFR BLD: 5.5 %
HDLC SERPL-MCNC: 42 MG/DL
LDLC SERPL CALC-MCNC: 122 MG/DL
NONHDLC SERPL-MCNC: 156 MG/DL
TRIGL SERPL-MCNC: 168 MG/DL
TSH SERPL DL<=0.005 MIU/L-ACNC: 2.14 UIU/ML (ref 0.3–4.2)

## 2025-04-09 PROCEDURE — 36415 COLL VENOUS BLD VENIPUNCTURE: CPT | Performed by: STUDENT IN AN ORGANIZED HEALTH CARE EDUCATION/TRAINING PROGRAM

## 2025-04-09 PROCEDURE — 80061 LIPID PANEL: CPT | Performed by: STUDENT IN AN ORGANIZED HEALTH CARE EDUCATION/TRAINING PROGRAM

## 2025-04-09 PROCEDURE — 83036 HEMOGLOBIN GLYCOSYLATED A1C: CPT | Performed by: STUDENT IN AN ORGANIZED HEALTH CARE EDUCATION/TRAINING PROGRAM

## 2025-04-09 PROCEDURE — 1126F AMNT PAIN NOTED NONE PRSNT: CPT | Performed by: STUDENT IN AN ORGANIZED HEALTH CARE EDUCATION/TRAINING PROGRAM

## 2025-04-09 PROCEDURE — 3078F DIAST BP <80 MM HG: CPT | Performed by: STUDENT IN AN ORGANIZED HEALTH CARE EDUCATION/TRAINING PROGRAM

## 2025-04-09 PROCEDURE — 99214 OFFICE O/P EST MOD 30 MIN: CPT | Mod: 25 | Performed by: STUDENT IN AN ORGANIZED HEALTH CARE EDUCATION/TRAINING PROGRAM

## 2025-04-09 PROCEDURE — 3074F SYST BP LT 130 MM HG: CPT | Performed by: STUDENT IN AN ORGANIZED HEALTH CARE EDUCATION/TRAINING PROGRAM

## 2025-04-09 PROCEDURE — 99396 PREV VISIT EST AGE 40-64: CPT | Mod: 25 | Performed by: STUDENT IN AN ORGANIZED HEALTH CARE EDUCATION/TRAINING PROGRAM

## 2025-04-09 PROCEDURE — 84443 ASSAY THYROID STIM HORMONE: CPT | Performed by: STUDENT IN AN ORGANIZED HEALTH CARE EDUCATION/TRAINING PROGRAM

## 2025-04-09 RX ORDER — NAPROXEN 500 MG/1
500 TABLET ORAL 2 TIMES DAILY WITH MEALS
Qty: 60 TABLET | Refills: 0 | Status: SHIPPED | OUTPATIENT
Start: 2025-04-09

## 2025-04-09 RX ORDER — LEVOTHYROXINE SODIUM 75 UG/1
75 TABLET ORAL DAILY
Qty: 90 TABLET | Refills: 3 | Status: SHIPPED | OUTPATIENT
Start: 2025-04-09

## 2025-04-09 RX ORDER — MUPIROCIN 20 MG/G
OINTMENT TOPICAL 3 TIMES DAILY
Qty: 30 G | Refills: 1 | Status: SHIPPED | OUTPATIENT
Start: 2025-04-09

## 2025-04-09 RX ORDER — SERTRALINE HYDROCHLORIDE 100 MG/1
100 TABLET, FILM COATED ORAL DAILY
Qty: 30 TABLET | Refills: 0 | Status: SHIPPED | OUTPATIENT
Start: 2025-04-09

## 2025-04-09 RX ORDER — ATENOLOL 25 MG/1
25 TABLET ORAL DAILY
Qty: 30 TABLET | Refills: 0 | Status: CANCELLED | OUTPATIENT
Start: 2025-04-09

## 2025-04-09 SDOH — HEALTH STABILITY: PHYSICAL HEALTH: ON AVERAGE, HOW MANY DAYS PER WEEK DO YOU ENGAGE IN MODERATE TO STRENUOUS EXERCISE (LIKE A BRISK WALK)?: 2 DAYS

## 2025-04-09 ASSESSMENT — SOCIAL DETERMINANTS OF HEALTH (SDOH): HOW OFTEN DO YOU GET TOGETHER WITH FRIENDS OR RELATIVES?: ONCE A WEEK

## 2025-04-09 ASSESSMENT — PAIN SCALES - GENERAL: PAINLEVEL_OUTOF10: NO PAIN (0)

## 2025-04-09 NOTE — PROGRESS NOTES
"Preventive Care Visit  [facility], located within [facility]  Martha Chaudhry DO, Family Medicine  Apr 9, 2025      Assessment & Plan     Routine general medical examination at a health care facility  Recommend annual wellness visits, screens, and immunizations as indicated.     Menstrual migraine without status migrainosus, not intractable  Trial of pre-cycle Naproxen series. Will take 500 mg twice daily starting either 3 days prior to anticipated flow or at start of symptoms. Continue through day 3 or 4 of cycle. If effective, please schedule a VIDEO follow-up prior to running out of medication to discuss.   - naproxen (NAPROSYN) 500 MG tablet; Take 1 tablet (500 mg) by mouth 2 times daily (with meals).    Sore in nose, intermittent  Intermittent, but responsive to prior therapy. Refills sent. Follow-up prn if changes or reduced response.   - mupirocin (BACTROBAN) 2 % external ointment; Apply topically 3 times daily.    Subclinical hypothyroidism  Wnl, no changes. Refills sent for 1 year.   - TSH WITH FREE T4 REFLEX; Future  - levothyroxine (SYNTHROID/LEVOTHROID) 75 MCG tablet; Take 1 tablet (75 mcg) by mouth daily.  - TSH WITH FREE T4 REFLEX    Paroxysmal supraventricular tachycardia  Stable. No changes to regimne. Refills for 1 year.     Situational anxiety  Stable. No changes to regimne. Refills for 1 year sent.   - sertraline (ZOLOFT) 100 MG tablet; Take 1 tablet (100 mg) by mouth daily.    Hyperlipidemia LDL goal <160  Lipid screening  Stable with low risk by ASCVD. NO statin indicated. Continue healthy diet and CV exercise.   - Lipid panel reflex to direct LDL Fasting; Future  - Lipid panel reflex to direct LDL Fasting    Class 1 obesity due to excess calories with serious comorbidity and body mass index (BMI) of 32.0 to 32.9 in adult  Estimated body mass index is 32.53 kg/m  as calculated from the following:    Height as of this encounter: 1.632 m (5' 4.25\").    Weight as of this encounter: 86.6 kg (191 " lb).   Weight management plan: Discussed healthy diet and exercise guidelines    Nasal sinus congestion  Continue flonase OTC prn.     Tobacco use disorder  Nicotine/Tobacco Cessation  She reports that she has been smoking cigarettes. She has a 1.5 pack-year smoking history. She has never used smokeless tobacco.  Nicotine/Tobacco Cessation Plan  Information offered: Patient not interested at this time    Prediabetes - RESOLVED  A1c back wnl.   - Hemoglobin A1c; Future  - Hemoglobin A1c    Pneumococcal vaccination declined  Hepatitis B vaccination declined    Counseling  Appropriate preventive services were addressed with this patient via screening, questionnaire, or discussion as appropriate for fall prevention, nutrition, physical activity, Tobacco-use cessation, social engagement, weight loss and cognition.  Checklist reviewing preventive services available has been given to the patient.  Reviewed patient's diet, addressing concerns and/or questions.   She is at risk for lack of exercise and has been provided with information to increase physical activity for the benefit of her well-being.     FUTURE APPOINTMENTS:       - Follow-up for annual visit or as needed    Marlen Zelaya is a 44 year old, presenting for the following:  Physical        4/9/2025     7:26 AM   Additional Questions   Roomed by Brooklynn ZAMORA    Menstrual migraine - gets HA associated with periods. Usually day before or around first day or so. Period are regular Interested in trying scheduled Naproxen.     Nose gets dry and gets sores. Prior PCP gave mupirocin for this and it was helpful. Out of this now and would like more.     Advance Care Planning  Patient does not have a Health Care Directive: Discussed advance care planning with patient; information given to patient to review.      4/9/2025   General Health   How would you rate your overall physical health? Good   Feel stress (tense, anxious, or unable to sleep) Only a little    (!) STRESS CONCERN      4/9/2025   Nutrition   Three or more servings of calcium each day? Yes   Diet: Regular (no restrictions)   How many servings of fruit and vegetables per day? (!) 2-3   How many sweetened beverages each day? 0-1         4/9/2025   Exercise   Days per week of moderate/strenous exercise 2 days   (!) EXERCISE CONCERN        4/9/2025   Social Factors   Frequency of gathering with friends or relatives Once a week   Worry food won't last until get money to buy more No   Food not last or not have enough money for food? No   Do you have housing? (Housing is defined as stable permanent housing and does not include staying ouside in a car, in a tent, in an abandoned building, in an overnight shelter, or couch-surfing.) Yes   Are you worried about losing your housing? No   Lack of transportation? No   Unable to get utilities (heat,electricity)? No         4/9/2025   Dental   Dentist two times every year? Yes         3/20/2024   TB Screening   Were you born outside of the [place]? No     Today's PHQ-2 Score:       4/9/2025     7:23 AM   PHQ-2 ( 1999 [employer])   Q1: Little interest or pleasure in doing things 0   Q2: Feeling down, depressed or hopeless 0   PHQ-2 Score 0    Q1: Little interest or pleasure in doing things Not at all   Q2: Feeling down, depressed or hopeless Not at all   PHQ-2 Score 0       Patient-reported         4/9/2025   Substance Use   Alcohol more than 3/day or more than 7/wk No   Do you use any other substances recreationally? No     Social History     Tobacco Use    Smoking status: Every Day     Current packs/day: 0.50     Average packs/day: 0.5 packs/day for 3.0 years (1.5 ttl pk-yrs)     Types: Cigarettes    Smokeless tobacco: Never   Vaping Use    Vaping status: Never Used   Substance Use Topics    Alcohol use: Yes     Comment: occasionally    Drug use: No         9/19/2024   LAST FHS-7 RESULTS   1st degree relative breast or ovarian cancer --   Any relative bilateral breast cancer No    Any male have breast cancer No   Any ONE woman have BOTH breast AND ovarian cancer No   Any woman with breast cancer before 50yrs No   2 or more relatives with breast AND/OR ovarian cancer Yes   2 or more relatives with breast AND/OR bowel cancer No     Mammogram Screening - Mammogram every 1-2 years updated in Health Maintenance based on mutual decision making  24 Neg Cat 1  Plans every other year.      2025   STI Screening   New sexual partner(s) since last STI/HIV test? No     History of abnormal Pap smear: No - age 30- 64 PAP with HPV every 5 years recommended        Latest Ref Rng & Units 2023     8:21 AM 2019     9:44 AM 10/31/2016     2:20 PM   PAP / HPV   PAP  Negative for Intraepithelial Lesion or Malignancy (NILM)      PAP (Historical)   NIL     HPV 16 DNA Negative Negative  Negative  Negative    HPV 18 DNA Negative Negative  Negative  Negative    Other HR HPV Negative Negative  Negative  Negative      ASCVD Risk   The 10-year ASCVD risk score (Angella REYNOSO, et al., 2019) is: 2.7%    Values used to calculate the score:      Age: 44 years      Sex: Female      Is Non- : No      Diabetic: No      Tobacco smoker: Yes      Systolic Blood Pressure: 112 mmHg      Is BP treated: No      HDL Cholesterol: 45 mg/dL      Total Cholesterol: 198 mg/dL        2025   Contraception/Family Planning   Questions about contraception or family planning No     Reviewed and updated as needed this visit by Provider   Tobacco  Allergies  Meds  Problems  Med Hx  Surg Hx  Fam Hx            Past Medical History:   Diagnosis Date    NO ACTIVE PROBLEMS      Past Surgical History:   Procedure Laterality Date    NO HISTORY OF SURGERY       OB History    Para Term  AB Living   0 0 0 0 0 0   SAB IAB Ectopic Multiple Live Births   0 0 0 0 0     Labs reviewed in EPIC  BP Readings from Last 3 Encounters:   25 112/74   24 122/70   23 104/72    Wt  Readings from Last 3 Encounters:   04/09/25 86.6 kg (191 lb)   03/20/24 91.9 kg (202 lb 9.6 oz)   02/27/23 88.5 kg (195 lb)           Patient Active Problem List   Diagnosis    Tobacco use disorder    Menstrual migraine    CARDIOVASCULAR SCREENING; LDL GOAL LESS THAN 160    Subclinical hypothyroidism    Encounter for surveillance of contraceptive pills    Nasal sinus congestion    Situational anxiety    Paroxysmal supraventricular tachycardia    Class 1 obesity due to excess calories without serious comorbidity with body mass index (BMI) of 32.0 to 32.9 in adult     Past Surgical History:   Procedure Laterality Date    NO HISTORY OF SURGERY         Social History     Tobacco Use    Smoking status: Every Day     Current packs/day: 0.50     Average packs/day: 0.5 packs/day for 3.0 years (1.5 ttl pk-yrs)     Types: Cigarettes    Smokeless tobacco: Never   Substance Use Topics    Alcohol use: Yes     Comment: occasionally     Family History   Problem Relation Age of Onset    Breast Cancer Mother 53    Hypertension Father     Prostate Cancer Father 50        negative testing for cancer genes    Cancer Father         kidney, metastatic to bone.     Hypertension Sister     Asthma Brother     Cancer Maternal Grandmother         lung    Cancer Maternal Grandfather         lung and bone    Cancer Paternal Grandfather         stomach    Cancer Maternal Uncle 50        rectal    Cancer Paternal Aunt 56        cervical    Cancer Paternal Aunt 56        ovarian         Current Outpatient Medications   Medication Sig Dispense Refill    atenolol (TENORMIN) 25 MG tablet Take 1 tablet by mouth once daily 30 tablet 0    fluticasone (FLONASE) 50 MCG/ACT nasal spray USE 2 SPRAY(S) IN EACH NOSTRIL ONCE DAILY AS NEEDED FOR  RHINITIS  OR  ALLERGIES 16 g 2    levothyroxine (SYNTHROID/LEVOTHROID) 75 MCG tablet Take 1 tablet (75 mcg) by mouth daily 90 tablet 3    sertraline (ZOLOFT) 100 MG tablet Take 1 tablet by mouth once daily 30 tablet 0  "   Cellulose-Citric Acid (PLENITY WELCOME KIT) CAPS Take 3 capsules by mouth 2 times daily 1 box is 4 weeks or 1 month supply. Take 3 capsules (1 pod) with 16 oz of water 20 minutes before lunch and dinner, 7 days a week. If a pre-meal dose is missed, take Plenity during or immediately after that meal. (Patient not taking: Reported on 4/9/2025) 168 capsule 5     No Known Allergies  Recent Labs   Lab Test 03/20/24  1744 06/15/23  0909 02/27/23  0845 02/27/23  0845 09/24/21  1008 07/22/19  1742 07/20/18  0942   A1C 5.7*  --   --   --   --   --   --    LDL  --   --   --  123*  --   --  99   HDL  --   --   --  45*  --   --  56   TRIG  --   --   --  150*  --   --  117   ALT  --   --   --   --  26  --   --    CR  --   --   --   --  0.85  --  0.84   GFRESTIMATED  --   --   --   --  87  --  77   GFRESTBLACK  --   --   --   --   --   --  >90   POTASSIUM  --   --   --   --   --   --  4.2   TSH 2.90 1.56   < > 3.39 2.42   < > 1.56    < > = values in this interval not displayed.               Review of Systems  Negative unless otherwise specified per HPI.       Objective    Exam  /74   Pulse 95   Temp 97.7  F (36.5  C) (Temporal)   Resp 16   Ht 1.632 m (5' 4.25\")   Wt 86.6 kg (191 lb)   SpO2 96%   BMI 32.53 kg/m     Estimated body mass index is 32.53 kg/m  as calculated from the following:    Height as of this encounter: 1.632 m (5' 4.25\").    Weight as of this encounter: 86.6 kg (191 lb).    Physical Exam  GENERAL: alert and no distress  EYES: Eyes grossly normal to inspection, PERRL and conjunctivae and sclerae normal  HENT: ear canals and TM's normal, nose and mouth without ulcers or lesions  NECK: no adenopathy, no asymmetry, masses, or scars  RESP: lungs clear to auscultation - no rales, rhonchi or wheezes  CV: regular rate and rhythm, no murmur, no peripheral edema  ABDOMEN: soft, nontender, no hepatosplenomegaly, no masses and bowel sounds normal  MS: no gross musculoskeletal defects noted, no edema  SKIN: " no suspicious lesions or rashes  NEURO: Normal strength and tone, mentation intact and speech normal  PSYCH: mentation appears normal, affect normal/bright      Signed Electronically by: Martha Chaudhry, DO

## 2025-04-09 NOTE — PATIENT INSTRUCTIONS
Patient Education   Preventive Care Advice   This is general advice given by our system to help you stay healthy. However, your care team may have specific advice just for you. Please talk to your care team about your preventive care needs.  Nutrition  Eat 5 or more servings of fruits and vegetables each day.  Try wheat bread, brown rice and whole grain pasta (instead of white bread, rice, and pasta).  Get enough calcium and vitamin D. Check the label on foods and aim for 100% of the RDA (recommended daily allowance).  Lifestyle  Exercise at least 150 minutes each week  (30 minutes a day, 5 days a week).  Do muscle strengthening activities 2 days a week. These help control your weight and prevent disease.  No smoking.  Wear sunscreen to prevent skin cancer.  Have a dental exam and cleaning every 6 months.  Yearly exams  See your health care team every year to talk about:  Any changes in your health.  Any medicines your care team has prescribed.  Preventive care, family planning, and ways to prevent chronic diseases.  Shots (vaccines)   HPV shots (up to age 26), if you've never had them before.  Hepatitis B shots (up to age 59), if you've never had them before.  COVID-19 shot: Get this shot when it's due.  Flu shot: Get a flu shot every year.  Tetanus shot: Get a tetanus shot every 10 years.  Pneumococcal, hepatitis A, and RSV shots: Ask your care team if you need these based on your risk.  Shingles shot (for age 50 and up)  General health tests  Diabetes screening:  Starting at age 35, Get screened for diabetes at least every 3 years.  If you are younger than age 35, ask your care team if you should be screened for diabetes.  Cholesterol test: At age 39, start having a cholesterol test every 5 years, or more often if advised.  Bone density scan (DEXA): At age 50, ask your care team if you should have this scan for osteoporosis (brittle bones).  Hepatitis C: Get tested at least once in your life.  STIs (sexually  transmitted infections)  Before age 24: Ask your care team if you should be screened for STIs.  After age 24: Get screened for STIs if you're at risk. You are at risk for STIs (including HIV) if:  You are sexually active with more than one person.  You don't use condoms every time.  You or a partner was diagnosed with a sexually transmitted infection.  If you are at risk for HIV, ask about PrEP medicine to prevent HIV.  Get tested for HIV at least once in your life, whether you are at risk for HIV or not.  Cancer screening tests  Cervical cancer screening: If you have a cervix, begin getting regular cervical cancer screening tests starting at age 21.  Breast cancer scan (mammogram): If you've ever had breasts, begin having regular mammograms starting at age 40. This is a scan to check for breast cancer.  Colon cancer screening: It is important to start screening for colon cancer at age 45.  Have a colonoscopy test every 10 years (or more often if you're at risk) Or, ask your provider about stool tests like a FIT test every year or Cologuard test every 3 years.  To learn more about your testing options, visit:   .  For help making a decision, visit:   https://bit.ly/ym57427.  Prostate cancer screening test: If you have a prostate, ask your care team if a prostate cancer screening test (PSA) at age 55 is right for you.  Lung cancer screening: If you are a current or former smoker ages 50 to 80, ask your care team if ongoing lung cancer screenings are right for you.  For informational purposes only. Not to replace the advice of your health care provider. Copyright   2023 [employer]. All rights reserved. Clinically reviewed by the [facility] Transitions Program. [place] [id number] - REV 01/24.

## 2025-04-23 DIAGNOSIS — I47.10 PAROXYSMAL SUPRAVENTRICULAR TACHYCARDIA: ICD-10-CM

## 2025-04-23 RX ORDER — ATENOLOL 25 MG/1
25 TABLET ORAL DAILY
Qty: 90 TABLET | Refills: 0 | Status: SHIPPED | OUTPATIENT
Start: 2025-04-23

## 2025-05-27 DIAGNOSIS — F41.8 SITUATIONAL ANXIETY: ICD-10-CM

## 2025-05-27 RX ORDER — SERTRALINE HYDROCHLORIDE 100 MG/1
100 TABLET, FILM COATED ORAL DAILY
Qty: 10 TABLET | Refills: 0 | Status: SHIPPED | OUTPATIENT
Start: 2025-05-27

## 2025-06-04 DIAGNOSIS — F41.8 SITUATIONAL ANXIETY: ICD-10-CM

## 2025-06-04 RX ORDER — SERTRALINE HYDROCHLORIDE 100 MG/1
100 TABLET, FILM COATED ORAL DAILY
Qty: 30 TABLET | Refills: 11 | Status: SHIPPED | OUTPATIENT
Start: 2025-06-04